# Patient Record
Sex: MALE | Race: ASIAN | NOT HISPANIC OR LATINO | URBAN - METROPOLITAN AREA
[De-identification: names, ages, dates, MRNs, and addresses within clinical notes are randomized per-mention and may not be internally consistent; named-entity substitution may affect disease eponyms.]

---

## 2023-10-24 ENCOUNTER — INPATIENT (INPATIENT)
Facility: HOSPITAL | Age: 28
LOS: 14 days | Discharge: HOME | End: 2023-11-08
Attending: STUDENT IN AN ORGANIZED HEALTH CARE EDUCATION/TRAINING PROGRAM | Admitting: STUDENT IN AN ORGANIZED HEALTH CARE EDUCATION/TRAINING PROGRAM
Payer: MEDICAID

## 2023-10-24 VITALS
DIASTOLIC BLOOD PRESSURE: 78 MMHG | SYSTOLIC BLOOD PRESSURE: 124 MMHG | HEART RATE: 110 BPM | TEMPERATURE: 99 F | OXYGEN SATURATION: 100 % | RESPIRATION RATE: 16 BRPM

## 2023-10-24 DIAGNOSIS — F29 UNSPECIFIED PSYCHOSIS NOT DUE TO A SUBSTANCE OR KNOWN PHYSIOLOGICAL CONDITION: ICD-10-CM

## 2023-10-24 LAB
ALBUMIN SERPL ELPH-MCNC: 4.8 G/DL — SIGNIFICANT CHANGE UP (ref 3.3–5)
ALBUMIN SERPL ELPH-MCNC: 4.8 G/DL — SIGNIFICANT CHANGE UP (ref 3.3–5)
ALP SERPL-CCNC: 79 U/L — SIGNIFICANT CHANGE UP (ref 40–120)
ALP SERPL-CCNC: 79 U/L — SIGNIFICANT CHANGE UP (ref 40–120)
ALT FLD-CCNC: 16 U/L — SIGNIFICANT CHANGE UP (ref 4–41)
ALT FLD-CCNC: 16 U/L — SIGNIFICANT CHANGE UP (ref 4–41)
AMPHET UR-MCNC: NEGATIVE — SIGNIFICANT CHANGE UP
AMPHET UR-MCNC: NEGATIVE — SIGNIFICANT CHANGE UP
ANION GAP SERPL CALC-SCNC: 12 MMOL/L — SIGNIFICANT CHANGE UP (ref 7–14)
ANION GAP SERPL CALC-SCNC: 12 MMOL/L — SIGNIFICANT CHANGE UP (ref 7–14)
APAP SERPL-MCNC: <10 UG/ML — LOW (ref 15–25)
APAP SERPL-MCNC: <10 UG/ML — LOW (ref 15–25)
APPEARANCE UR: CLEAR — SIGNIFICANT CHANGE UP
APPEARANCE UR: CLEAR — SIGNIFICANT CHANGE UP
AST SERPL-CCNC: 18 U/L — SIGNIFICANT CHANGE UP (ref 4–40)
AST SERPL-CCNC: 18 U/L — SIGNIFICANT CHANGE UP (ref 4–40)
BARBITURATES UR SCN-MCNC: NEGATIVE — SIGNIFICANT CHANGE UP
BARBITURATES UR SCN-MCNC: NEGATIVE — SIGNIFICANT CHANGE UP
BASOPHILS # BLD AUTO: 0.04 K/UL — SIGNIFICANT CHANGE UP (ref 0–0.2)
BASOPHILS # BLD AUTO: 0.04 K/UL — SIGNIFICANT CHANGE UP (ref 0–0.2)
BASOPHILS NFR BLD AUTO: 0.4 % — SIGNIFICANT CHANGE UP (ref 0–2)
BASOPHILS NFR BLD AUTO: 0.4 % — SIGNIFICANT CHANGE UP (ref 0–2)
BENZODIAZ UR-MCNC: NEGATIVE — SIGNIFICANT CHANGE UP
BENZODIAZ UR-MCNC: NEGATIVE — SIGNIFICANT CHANGE UP
BILIRUB SERPL-MCNC: 0.7 MG/DL — SIGNIFICANT CHANGE UP (ref 0.2–1.2)
BILIRUB SERPL-MCNC: 0.7 MG/DL — SIGNIFICANT CHANGE UP (ref 0.2–1.2)
BILIRUB UR-MCNC: NEGATIVE — SIGNIFICANT CHANGE UP
BILIRUB UR-MCNC: NEGATIVE — SIGNIFICANT CHANGE UP
BUN SERPL-MCNC: 8 MG/DL — SIGNIFICANT CHANGE UP (ref 7–23)
BUN SERPL-MCNC: 8 MG/DL — SIGNIFICANT CHANGE UP (ref 7–23)
CALCIUM SERPL-MCNC: 9.6 MG/DL — SIGNIFICANT CHANGE UP (ref 8.4–10.5)
CALCIUM SERPL-MCNC: 9.6 MG/DL — SIGNIFICANT CHANGE UP (ref 8.4–10.5)
CHLORIDE SERPL-SCNC: 102 MMOL/L — SIGNIFICANT CHANGE UP (ref 98–107)
CHLORIDE SERPL-SCNC: 102 MMOL/L — SIGNIFICANT CHANGE UP (ref 98–107)
CO2 SERPL-SCNC: 24 MMOL/L — SIGNIFICANT CHANGE UP (ref 22–31)
CO2 SERPL-SCNC: 24 MMOL/L — SIGNIFICANT CHANGE UP (ref 22–31)
COCAINE METAB.OTHER UR-MCNC: NEGATIVE — SIGNIFICANT CHANGE UP
COCAINE METAB.OTHER UR-MCNC: NEGATIVE — SIGNIFICANT CHANGE UP
COLOR SPEC: YELLOW — SIGNIFICANT CHANGE UP
COLOR SPEC: YELLOW — SIGNIFICANT CHANGE UP
CREAT SERPL-MCNC: 1.11 MG/DL — SIGNIFICANT CHANGE UP (ref 0.5–1.3)
CREAT SERPL-MCNC: 1.11 MG/DL — SIGNIFICANT CHANGE UP (ref 0.5–1.3)
CREATININE URINE RESULT, DAU: 58 MG/DL — SIGNIFICANT CHANGE UP
CREATININE URINE RESULT, DAU: 58 MG/DL — SIGNIFICANT CHANGE UP
DIFF PNL FLD: NEGATIVE — SIGNIFICANT CHANGE UP
DIFF PNL FLD: NEGATIVE — SIGNIFICANT CHANGE UP
EGFR: 93 ML/MIN/1.73M2 — SIGNIFICANT CHANGE UP
EGFR: 93 ML/MIN/1.73M2 — SIGNIFICANT CHANGE UP
EOSINOPHIL # BLD AUTO: 0.23 K/UL — SIGNIFICANT CHANGE UP (ref 0–0.5)
EOSINOPHIL # BLD AUTO: 0.23 K/UL — SIGNIFICANT CHANGE UP (ref 0–0.5)
EOSINOPHIL NFR BLD AUTO: 2.5 % — SIGNIFICANT CHANGE UP (ref 0–6)
EOSINOPHIL NFR BLD AUTO: 2.5 % — SIGNIFICANT CHANGE UP (ref 0–6)
ETHANOL SERPL-MCNC: <10 MG/DL — SIGNIFICANT CHANGE UP
ETHANOL SERPL-MCNC: <10 MG/DL — SIGNIFICANT CHANGE UP
GLUCOSE SERPL-MCNC: 92 MG/DL — SIGNIFICANT CHANGE UP (ref 70–99)
GLUCOSE SERPL-MCNC: 92 MG/DL — SIGNIFICANT CHANGE UP (ref 70–99)
GLUCOSE UR QL: NEGATIVE MG/DL — SIGNIFICANT CHANGE UP
GLUCOSE UR QL: NEGATIVE MG/DL — SIGNIFICANT CHANGE UP
HCT VFR BLD CALC: 49.1 % — SIGNIFICANT CHANGE UP (ref 39–50)
HCT VFR BLD CALC: 49.1 % — SIGNIFICANT CHANGE UP (ref 39–50)
HGB BLD-MCNC: 16.6 G/DL — SIGNIFICANT CHANGE UP (ref 13–17)
HGB BLD-MCNC: 16.6 G/DL — SIGNIFICANT CHANGE UP (ref 13–17)
IANC: 5.78 K/UL — SIGNIFICANT CHANGE UP (ref 1.8–7.4)
IANC: 5.78 K/UL — SIGNIFICANT CHANGE UP (ref 1.8–7.4)
IMM GRANULOCYTES NFR BLD AUTO: 0.2 % — SIGNIFICANT CHANGE UP (ref 0–0.9)
IMM GRANULOCYTES NFR BLD AUTO: 0.2 % — SIGNIFICANT CHANGE UP (ref 0–0.9)
KETONES UR-MCNC: NEGATIVE MG/DL — SIGNIFICANT CHANGE UP
KETONES UR-MCNC: NEGATIVE MG/DL — SIGNIFICANT CHANGE UP
LEUKOCYTE ESTERASE UR-ACNC: NEGATIVE — SIGNIFICANT CHANGE UP
LEUKOCYTE ESTERASE UR-ACNC: NEGATIVE — SIGNIFICANT CHANGE UP
LYMPHOCYTES # BLD AUTO: 2.59 K/UL — SIGNIFICANT CHANGE UP (ref 1–3.3)
LYMPHOCYTES # BLD AUTO: 2.59 K/UL — SIGNIFICANT CHANGE UP (ref 1–3.3)
LYMPHOCYTES # BLD AUTO: 28.1 % — SIGNIFICANT CHANGE UP (ref 13–44)
LYMPHOCYTES # BLD AUTO: 28.1 % — SIGNIFICANT CHANGE UP (ref 13–44)
MCHC RBC-ENTMCNC: 29 PG — SIGNIFICANT CHANGE UP (ref 27–34)
MCHC RBC-ENTMCNC: 29 PG — SIGNIFICANT CHANGE UP (ref 27–34)
MCHC RBC-ENTMCNC: 33.8 GM/DL — SIGNIFICANT CHANGE UP (ref 32–36)
MCHC RBC-ENTMCNC: 33.8 GM/DL — SIGNIFICANT CHANGE UP (ref 32–36)
MCV RBC AUTO: 85.7 FL — SIGNIFICANT CHANGE UP (ref 80–100)
MCV RBC AUTO: 85.7 FL — SIGNIFICANT CHANGE UP (ref 80–100)
METHADONE UR-MCNC: NEGATIVE — SIGNIFICANT CHANGE UP
METHADONE UR-MCNC: NEGATIVE — SIGNIFICANT CHANGE UP
MONOCYTES # BLD AUTO: 0.57 K/UL — SIGNIFICANT CHANGE UP (ref 0–0.9)
MONOCYTES # BLD AUTO: 0.57 K/UL — SIGNIFICANT CHANGE UP (ref 0–0.9)
MONOCYTES NFR BLD AUTO: 6.2 % — SIGNIFICANT CHANGE UP (ref 2–14)
MONOCYTES NFR BLD AUTO: 6.2 % — SIGNIFICANT CHANGE UP (ref 2–14)
NEUTROPHILS # BLD AUTO: 5.78 K/UL — SIGNIFICANT CHANGE UP (ref 1.8–7.4)
NEUTROPHILS # BLD AUTO: 5.78 K/UL — SIGNIFICANT CHANGE UP (ref 1.8–7.4)
NEUTROPHILS NFR BLD AUTO: 62.6 % — SIGNIFICANT CHANGE UP (ref 43–77)
NEUTROPHILS NFR BLD AUTO: 62.6 % — SIGNIFICANT CHANGE UP (ref 43–77)
NITRITE UR-MCNC: NEGATIVE — SIGNIFICANT CHANGE UP
NITRITE UR-MCNC: NEGATIVE — SIGNIFICANT CHANGE UP
NRBC # BLD: 0 /100 WBCS — SIGNIFICANT CHANGE UP (ref 0–0)
NRBC # BLD: 0 /100 WBCS — SIGNIFICANT CHANGE UP (ref 0–0)
NRBC # FLD: 0 K/UL — SIGNIFICANT CHANGE UP (ref 0–0)
NRBC # FLD: 0 K/UL — SIGNIFICANT CHANGE UP (ref 0–0)
OPIATES UR-MCNC: NEGATIVE — SIGNIFICANT CHANGE UP
OPIATES UR-MCNC: NEGATIVE — SIGNIFICANT CHANGE UP
OXYCODONE UR-MCNC: NEGATIVE — SIGNIFICANT CHANGE UP
OXYCODONE UR-MCNC: NEGATIVE — SIGNIFICANT CHANGE UP
PCP SPEC-MCNC: SIGNIFICANT CHANGE UP
PCP SPEC-MCNC: SIGNIFICANT CHANGE UP
PCP UR-MCNC: NEGATIVE — SIGNIFICANT CHANGE UP
PCP UR-MCNC: NEGATIVE — SIGNIFICANT CHANGE UP
PH UR: 7 — SIGNIFICANT CHANGE UP (ref 5–8)
PH UR: 7 — SIGNIFICANT CHANGE UP (ref 5–8)
PLATELET # BLD AUTO: 274 K/UL — SIGNIFICANT CHANGE UP (ref 150–400)
PLATELET # BLD AUTO: 274 K/UL — SIGNIFICANT CHANGE UP (ref 150–400)
POTASSIUM SERPL-MCNC: 3.9 MMOL/L — SIGNIFICANT CHANGE UP (ref 3.5–5.3)
POTASSIUM SERPL-MCNC: 3.9 MMOL/L — SIGNIFICANT CHANGE UP (ref 3.5–5.3)
POTASSIUM SERPL-SCNC: 3.9 MMOL/L — SIGNIFICANT CHANGE UP (ref 3.5–5.3)
POTASSIUM SERPL-SCNC: 3.9 MMOL/L — SIGNIFICANT CHANGE UP (ref 3.5–5.3)
PROT SERPL-MCNC: 7.3 G/DL — SIGNIFICANT CHANGE UP (ref 6–8.3)
PROT SERPL-MCNC: 7.3 G/DL — SIGNIFICANT CHANGE UP (ref 6–8.3)
PROT UR-MCNC: NEGATIVE MG/DL — SIGNIFICANT CHANGE UP
PROT UR-MCNC: NEGATIVE MG/DL — SIGNIFICANT CHANGE UP
RBC # BLD: 5.73 M/UL — SIGNIFICANT CHANGE UP (ref 4.2–5.8)
RBC # BLD: 5.73 M/UL — SIGNIFICANT CHANGE UP (ref 4.2–5.8)
RBC # FLD: 12.7 % — SIGNIFICANT CHANGE UP (ref 10.3–14.5)
RBC # FLD: 12.7 % — SIGNIFICANT CHANGE UP (ref 10.3–14.5)
SALICYLATES SERPL-MCNC: <0.3 MG/DL — LOW (ref 15–30)
SALICYLATES SERPL-MCNC: <0.3 MG/DL — LOW (ref 15–30)
SARS-COV-2 RNA SPEC QL NAA+PROBE: SIGNIFICANT CHANGE UP
SARS-COV-2 RNA SPEC QL NAA+PROBE: SIGNIFICANT CHANGE UP
SODIUM SERPL-SCNC: 138 MMOL/L — SIGNIFICANT CHANGE UP (ref 135–145)
SODIUM SERPL-SCNC: 138 MMOL/L — SIGNIFICANT CHANGE UP (ref 135–145)
SP GR SPEC: 1.01 — SIGNIFICANT CHANGE UP (ref 1–1.03)
SP GR SPEC: 1.01 — SIGNIFICANT CHANGE UP (ref 1–1.03)
THC UR QL: NEGATIVE — SIGNIFICANT CHANGE UP
THC UR QL: NEGATIVE — SIGNIFICANT CHANGE UP
TOXICOLOGY SCREEN, DRUGS OF ABUSE, SERUM RESULT: SIGNIFICANT CHANGE UP
TOXICOLOGY SCREEN, DRUGS OF ABUSE, SERUM RESULT: SIGNIFICANT CHANGE UP
TSH SERPL-MCNC: 1.25 UIU/ML — SIGNIFICANT CHANGE UP (ref 0.27–4.2)
TSH SERPL-MCNC: 1.25 UIU/ML — SIGNIFICANT CHANGE UP (ref 0.27–4.2)
UROBILINOGEN FLD QL: 0.2 MG/DL — SIGNIFICANT CHANGE UP (ref 0.2–1)
UROBILINOGEN FLD QL: 0.2 MG/DL — SIGNIFICANT CHANGE UP (ref 0.2–1)
WBC # BLD: 9.23 K/UL — SIGNIFICANT CHANGE UP (ref 3.8–10.5)
WBC # BLD: 9.23 K/UL — SIGNIFICANT CHANGE UP (ref 3.8–10.5)
WBC # FLD AUTO: 9.23 K/UL — SIGNIFICANT CHANGE UP (ref 3.8–10.5)
WBC # FLD AUTO: 9.23 K/UL — SIGNIFICANT CHANGE UP (ref 3.8–10.5)

## 2023-10-24 PROCEDURE — 99285 EMERGENCY DEPT VISIT HI MDM: CPT

## 2023-10-24 RX ORDER — CHLORPROMAZINE HCL 10 MG
50 TABLET ORAL EVERY 6 HOURS
Refills: 0 | Status: DISCONTINUED | OUTPATIENT
Start: 2023-10-25 | End: 2023-10-26

## 2023-10-24 RX ORDER — CHLORPROMAZINE HCL 10 MG
50 TABLET ORAL EVERY 6 HOURS
Refills: 0 | Status: DISCONTINUED | OUTPATIENT
Start: 2023-10-25 | End: 2023-11-08

## 2023-10-24 RX ORDER — ARIPIPRAZOLE 15 MG/1
5 TABLET ORAL DAILY
Refills: 0 | Status: DISCONTINUED | OUTPATIENT
Start: 2023-10-25 | End: 2023-10-27

## 2023-10-24 RX ORDER — DIPHENHYDRAMINE HCL 50 MG
50 CAPSULE ORAL AT BEDTIME
Refills: 0 | Status: DISCONTINUED | OUTPATIENT
Start: 2023-10-25 | End: 2023-11-08

## 2023-10-24 RX ORDER — ARIPIPRAZOLE 15 MG/1
5 TABLET ORAL DAILY
Refills: 0 | Status: DISCONTINUED | OUTPATIENT
Start: 2023-10-24 | End: 2023-10-25

## 2023-10-24 RX ORDER — DIPHENHYDRAMINE HCL 50 MG
50 CAPSULE ORAL AT BEDTIME
Refills: 0 | Status: DISCONTINUED | OUTPATIENT
Start: 2023-10-24 | End: 2023-10-25

## 2023-10-24 NOTE — ED PROVIDER NOTE - PHYSICAL EXAMINATION
CONSTITUTIONAL: Well-appearing; well-nourished; in no apparent distress;  HEAD: Normocephalic, atraumatic;  EYES: conjunctiva and sclera WNL;;  CARD: Normal S1, S2; no murmurs, rubs, or gallops noted  RESP: Normal chest excursion with respiration; breath sounds clear and equal bilaterally; no wheezes, rhonchi, or rales noted  EXT/MS: moves all extremities  SKIN: Normal for age and race  NEURO: Awake, alert, oriented x 3, no gross deficits  PSYCH: Normal mood; flat affect

## 2023-10-24 NOTE — ED BEHAVIORAL HEALTH ASSESSMENT NOTE - SUMMARY
27yo M with history of "Psychosis," (unclear to what degree Cannabis use played a part), last at baseline 2 years prior and never returned to that baseline, 1 prior inpatient psychiatric admission x 1 week at [facility] in 2021 for paranoia and put on Celexa and haldol;  was attending outpatient mental health services until 1 year ago, not on any psychiatric medications since that time presenting with increasingly disorganized, bizarre behavior, increased Holiness preoccupation, paranoia, poor sleep,    + poor ADLs (wearing/sleeping in same clothes for days, last hair cut 1 yr prior), restlessness, poor appetite, whispering to himself. Family is advocating for psychiatric admission. Patient is not able to meet his basic needs in his current state and meets involuntary 9.39 criteria.

## 2023-10-24 NOTE — ED BEHAVIORAL HEALTH ASSESSMENT NOTE - HPI (INCLUDE ILLNESS QUALITY, SEVERITY, DURATION, TIMING, CONTEXT, MODIFYING FACTORS, ASSOCIATED SIGNS AND SYMPTOMS)
As per request of provider, writer met with pt’s Mom Emilia [phone] and brother Adams [phone] to obtain collateral information. The following information is per the family.    Patient is a 29 Yo male domiciled w/ mother and brother, hx of psychosis , not working or studying, bib ems activated by family.    Reason for ED visit: Family followed pt after he went out for cigarettes. Pt reported walking to [facility] from [place] to apply for jobs. Family feels he has been more paranoid and disorganized since Friday 10/20/2023.    Symptoms/Hx:. Since Friday pt has been praying 5-10x a day and for as long as 40 minutes. Family says the pt reported being fearful on Friday and on Sunday he asked the mother to sleep with him at night. Family says pt has been sleeping in his clothes since Friday, sleeping poorly, and barely eating since Friday. Family says pt has not endorsed any si or hi and has no hx of this. Family says pt’s hygiene is okay but hasn’t gotten a haircut in 1 year. Family says pt spends his time for the past 1-2 years smoking cigarettes and watching tv. They noticed recently he has been more restless and not sitting on the couch. They suspect he is internally pre occupied and will whisper to himself. Family reports pt was in treatment 2 years ago but no treatment for the past 1.5- 2 years.     Baseline: at baseline he is friendly, works in [employer] and can have a full conversation. For the past 1-2 years he has been isolating, walking to [facility], smoking cigarettes and watching tv.     Drugs/Alcohol: no current drug use. Hx of marijuana use.    Stressors: unknown. Pt refuses treatment.    Treatment team: none currently. Pt has one prior admission at [facility] for a  week 2 years ago due to paranoia. At that time pt was fearful people was trying to get him and was running out into the highway.    Medical problems: none.    Medication: none.    Family hx : unknown.    Violence/Aggression: pt can be agitated with brother and will push him when they try to stop the pt from walking away.    Dispo: family is advocating for psych admission.      Electronic Signatures:  Sonu Osei (MSW)  (Signed 24-Oct-2023 16:41)  	Authored: NOTE 29yo M, single, noncaregiver, childless, never , not in school, unemployed, domiciled with mother and brother, at baseline described as friendly, worked in [employer] and can have a full conversation, started to show behavioral/functional changes for the past 1-2 years (ie. isolating, not able to work, spends his days watching TV, walks to local [employer] to get his cigarettes and smokes a lot), has 1 prior inpatient psychiatric admission x 1 week at [facility] 2 years ago (due to paranoia; Pt was fearful people was trying to get him and was running out into the highway), hx of Cannabis use, with reported hx of Psychosis (exact previous psychiatric diagnosis not known at this time), was attending outpatient mental health services until 1 year ago, not on any psychiatric medications at this time, has no known hx of illicit substance use, who was BIB EMS today for increasing disorganized, bizarre behavior. 911 was called by his family.     Namely, Patient ventured out for his usual [employer] stint but kept going. Patient's family followed at this time due to him acting increasingly oddly in the preceding week. Patient told them that he was walking to [facility] (from [place]) to apply for jobs (these job & interviews do not exist). Family noted that he has been more paranoid and disorganized since Friday 10/20/2023.. Since Friday pt has been praying 5-10x a day and for as long as 40 minutes which is not his norm. + tearful, reported feeling scared and Pt asked his mother to sleep with him at night. + poor ADLs - Pt is sleeping in his same regular clothes since Friday, sleeping poorly, and barely eating and his last haircut was 1 year ago. + Patient has been more restless and not sitting on the couch as he usually does when watching TV. + Patient noted to be whispering to himself. Family is advocating for psychiatric admission.     COLLATERAL FROM Pt’s Mom Emilia [phone] and brother Adams [phone]: please see separate  note  CVM no record of Patient 29yo [place]  M, single, noncaregiver, childless, never , not in school, unemployed, domiciled with mother and brother, at baseline described as friendly, worked in [employer] and can have a full conversation, started to show behavioral/functional changes for the past 1-2 years (ie. isolating, not able to work, spends his days watching TV, walks to local [employer] to get his cigarettes and smokes a lot), has 1 prior inpatient psychiatric admission x 1 week at [facility] 2 years ago (due to paranoia; Pt was fearful people was trying to get him and was running out into the highway; given Celexa, haldol), hx of Cannabis use, with reported hx of Psychosis (exact previous psychiatric diagnosis not known at this time), was attending outpatient mental health services until 1 year ago, not on any psychiatric medications at this time, has no known hx of illicit substance use, who was BIB EMS today for increasing disorganized, bizarre behavior. 911 was called by his family. Namely, Patient ventured out for his usual [employer] stint but kept going. Patient's family followed at this time due to him acting increasingly oddly in the preceding week. Patient told them that he was walking to [facility] (from [place]) to apply for jobs (these job & interviews do not exist). Family noted that he has been more paranoid and disorganized since Friday 10/20/2023.. Since Friday pt has been praying 5-10x a day and for as long as 40 minutes which is not his norm. + tearful, reported feeling scared and Pt asked his mother to sleep with him at night. + poor ADLs - Pt is sleeping in his same regular clothes since Friday, sleeping poorly, and barely eating and his last haircut was 1 year ago. + Patient has been more restless and not sitting on the couch as he usually does when watching TV. + Patient noted to be whispering to himself. Family is advocating for psychiatric admission.     EXAM: calm, cooperative, sitting on the bed with a wide-eye semi-intense (but not hostile) stare with decrease in blinking. Polite, answers all questions in a superficial, lacking in detail manner. For example: "I went to [employer]." Q: "for money?" A: "yes." Q: "to buy what?" A: " things." Patient says that he has been doing fine, sleeping, eating ok, likes watching "Friends" on TV although he considers himself a loner and does not want friends of his own. Patient denies paranoia such as being followed, watched etc, Says his mother and brother "worried about me and followed me on the street and then were yelling." Patient says he was brought here to make sure "tests are ok." Says he was at [facility] because of a "nervous breakdown," was put on sertraline and haldol which "made me feel lightheaded." Denies that he has been more Taoism as of late and says "I believe in Latter-day." Denies any suicidal ideation, intent or plan; denies any thoughts of hurting anyone else. Denies any substance use including cannabis.     COLLATERAL FROM Pt’s Mom Emilia [phone] and brother Adams [phone]: please see separate  note  CVM no record of Patient

## 2023-10-24 NOTE — ED BEHAVIORAL HEALTH ASSESSMENT NOTE - PSYCHIATRIC ISSUES AND PLAN (INCLUDE STANDING AND PRN MEDICATION)
start Abilify 5mg PO qd start Abilify 5mg PO qd (was lightheaded on haldol, Celexa). No indication of a mood component at this time - only psychosis

## 2023-10-24 NOTE — ED BEHAVIORAL HEALTH ASSESSMENT NOTE - RISK ASSESSMENT
Chronic risk factors: single, male gender, hx of psychosis, hx of substance use, noncompliance with medications and treatment. Protective factors: young; healthy; no suicide attempts; no self-injurious behavior; no hx of aggression/violence; no legal issues; stable domicile; strong family support. Acute risk factors identified: decompensated state impairing functioning

## 2023-10-24 NOTE — ED BEHAVIORAL HEALTH NOTE - BEHAVIORAL HEALTH NOTE
As per request of provider, writer met with pt’s Mom betsy [phone] and brother Adams [phone] to obtain collateral information. The following information is per the family.    Patient is a 29 Yo male domiciled w/ mother and brother, hx of psychosis , not working or studying, bib ems activated by family.    Reason for ED visit: Family followed pt after he went out for cigarettes. Pt reported walking to [facility] from [place] to apply for jobs. Family feels he has been more paranoid and disorganized since Friday 10/20/2023.    Symptoms/Hx:. Since Friday pt has been praying 5-10x a day and for as long as 40 minutes. Family says the pt reported being fearful on Friday and on Sunday he asked the mother to sleep with him at night. Family says pt has been sleeping in his clothes since Friday, sleeping poorly, and barely eating since Friday. Family says pt has not endorsed any si or hi and has no hx of this. Family says pt’s hygiene is okay but hasn’t gotten a haircut in 1 year. Family says pt spends his time for the past 1-2 years smoking cigarettes and watching tv. They noticed recently he has been more restless and not sitting on the couch. They suspect he is internally pre occupied and will whisper to himself. Family reports pt was in treatment 2 years ago but no treatment for the past 1.5- 2 years.     Baseline: at baseline he is friendly, works in [employer] and can have a full conversation. For the past 1-2 years he has been isolating, walking to [facility], smoking cigarettes and watching tv.     Drugs/Alcohol: no current drug use. Hx of marijuana use.    Stressors: unknown. Pt refuses treatment.    Treatment team: none currently. Pt has one prior admission at [facility] for a  week 2 years ago due to paranoia. At that time pt was fearful people was trying to get him and was running out into the highway.    Medical problems: none.    Medication: none.    Family hx : unknown.    Violence/Aggression: pt can be agitated with brother and will push him when they try to stop the pt from walking away.    Dispo: family is advocating for psych admission. As per request of provider, writer met with pt’s Mom betsy [phone] and brother Adams [phone] to obtain collateral information. The following information is per the family.    Patient is a 27 Yo male domiciled w/ mother and brother, hx of psychosis , not working or studying, bib ems activated by family.    Reason for ED visit: Family followed pt after he went out for cigarettes. Pt reported walking to [facility] from [place] to apply for jobs. Family feels he has been more paranoid and disorganized since Friday 10/20/2023.    Symptoms/Hx:. Since Friday pt has been praying 5-10x a day and for as long as 40 minutes. Family says the pt reported being fearful on Friday and on Sunday he asked the mother to sleep with him at night. Family says pt has been sleeping in his clothes since Friday, sleeping poorly, and barely eating since Friday. Family says pt has not endorsed any si or hi and has no hx of this. Family says pt’s hygiene is okay but hasn’t gotten a haircut in 1 year. Family says pt spends his time for the past 1-2 years smoking cigarettes and watching tv. They noticed recently he has been more restless and not sitting on the couch. They suspect he is internally pre occupied and will whisper to himself. Family reports pt was in treatment 2 years ago but no treatment for the past 1.5- 2 years.     Baseline: at baseline he is friendly, works in [employer] and can have a full conversation. For the past 1-2 years he has been isolating, walking to [facility], smoking cigarettes and watching tv.     Drugs/Alcohol: no current drug use. Hx of marijuana use.    Stressors: unknown. Pt refuses treatment.    Treatment team: none currently. Pt has one prior admission at [facility] for a  week 2 years ago due to paranoia. At that time pt was fearful people was trying to get him and was running out into the highway.    Medical problems: none.    Medication: none.    Family hx : unknown.    Violence/Aggression: pt can be agitated with brother and will push him when they try to stop the pt from walking away.    Dispo: family is advocating for psych admission.     Writer informed brother [phone] of pt's admission and that he is boarding due to bed availability. to be reviewed in OR/n/a

## 2023-10-24 NOTE — ED BEHAVIORAL HEALTH ASSESSMENT NOTE - OTHER
solemn, serious, nonlabile wide-eye semi-intense (but not hostile) stare with decrease in blinking. impaired overly long hair unable to fully ascertain given Pt's guarded and superficial answers low end of fair limited not able to exclude internal stimuli

## 2023-10-24 NOTE — ED ADULT TRIAGE NOTE - CHIEF COMPLAINT QUOTE
Pt c/o psychiatric evaluation. Reports family called EMS for concern for pt wandering frequently. Denies SI, HI, AH/VH, drug or ETOH use. Calm and cooperative. Hx of psychiatric admissions in past. Not on medications. To be seen in BH

## 2023-10-24 NOTE — ED ADULT NURSE NOTE - NSFALLUNIVINTERV_ED_ALL_ED
Bed/Stretcher in lowest position, wheels locked, appropriate side rails in place/Call bell, personal items and telephone in reach/Instruct patient to call for assistance before getting out of bed/chair/stretcher/Non-slip footwear applied when patient is off stretcher/[place] to call system/Physically safe environment - no spills, clutter or unnecessary equipment/Purposeful proactive rounding/Room/bathroom lighting operational, light cord in reach

## 2023-10-24 NOTE — ED BEHAVIORAL HEALTH ASSESSMENT NOTE - DETAILS
denies suicidality haldol, Celexa - lightheaded [facility] called for hand off family informed of admission, ED boarding until bed obtained

## 2023-10-24 NOTE — ED PROVIDER NOTE - OBJECTIVE STATEMENT
28yoM no PMH, domiciled with mom and brother, unemployed, past psych hx including hospitalization at [facility] for ~1week for a "nervous breakdown" and was started on celexa which he had stopped taking > 1 year ago, brought in by EMS after family concerned about him. As per EMS, family states that he has been leaving the house everyday, no idea where he is / where he is going. Today he was going outside for a walk to get some cigarettes and said his mom was following him, screaming at him and harrassing him. Pt states that he has been unemployed since last oct (was a ) and stopped working because he worked too much, and has been job searching and getting out of the house daily. Pt denies any SI/HI/AH/VH. denies drug use. went to rehab to stop smoking cigarettes and marijuana, however started smoking cigarettes again months back.

## 2023-10-24 NOTE — ED ADULT NURSE NOTE - OBJECTIVE STATEMENT
Pt arrives via ems. Pt's family called EMS for concern for pt wandering frequently. Denies SI, HI, AH/VH, drug or ETOH use. Calm and cooperative. Hx of psychiatric admissions in past. Not on medications.

## 2023-10-24 NOTE — ED PROVIDER NOTE - CLINICAL SUMMARY MEDICAL DECISION MAKING FREE TEXT BOX
28yoM no PMH, domiciled with mom and brother, unemployed, past psych hx including hospitalization at [facility] for ~1week for a "nervous breakdown" and was started on celexa which he had stopped taking > 1 year ago, brought in by EMS after family concerned about him. As per EMS, family states that he has been leaving the house everyday, no idea where he is / where he is going. Today he was going outside for a walk to get some cigarettes and said his mom was following him, screaming at him and harrassing him. Pt states that he has been unemployed since last oct (was a ) and stopped working because he worked too much, and has been job searching and getting out of the house daily. Pt denies any SI/HI/AH/VH. denies drug use. went to rehab to stop smoking cigarettes and marijuana, however started smoking cigarettes again months back.    pt with appropriate behavior, does not appear internally occupied or psychotic, remains calm and cooperative, good hygeine  do not suspect need for psych eval  will obtain collateral from [facility] from mother Noreen [phone]

## 2023-10-25 DIAGNOSIS — F12.10 CANNABIS ABUSE, UNCOMPLICATED: ICD-10-CM

## 2023-10-25 DIAGNOSIS — Z91.148 PATIENT'S OTHER NONCOMPLIANCE WITH MEDICATION REGIMEN FOR OTHER REASON: ICD-10-CM

## 2023-10-25 LAB
A1C WITH ESTIMATED AVERAGE GLUCOSE RESULT: 5.2 % — SIGNIFICANT CHANGE UP (ref 4–5.6)
A1C WITH ESTIMATED AVERAGE GLUCOSE RESULT: 5.2 % — SIGNIFICANT CHANGE UP (ref 4–5.6)
CHOLEST SERPL-MCNC: 233 MG/DL — HIGH
CHOLEST SERPL-MCNC: 233 MG/DL — HIGH
ESTIMATED AVERAGE GLUCOSE: 103 — SIGNIFICANT CHANGE UP
ESTIMATED AVERAGE GLUCOSE: 103 — SIGNIFICANT CHANGE UP
HDLC SERPL-MCNC: 38 MG/DL — LOW
HDLC SERPL-MCNC: 38 MG/DL — LOW
LIPID PNL WITH DIRECT LDL SERPL: 164 MG/DL — HIGH
LIPID PNL WITH DIRECT LDL SERPL: 164 MG/DL — HIGH
NON HDL CHOLESTEROL: 195 MG/DL — HIGH
NON HDL CHOLESTEROL: 195 MG/DL — HIGH
TRIGL SERPL-MCNC: 154 MG/DL — HIGH
TRIGL SERPL-MCNC: 154 MG/DL — HIGH

## 2023-10-25 PROCEDURE — 99223 1ST HOSP IP/OBS HIGH 75: CPT

## 2023-10-25 RX ORDER — INFLUENZA VIRUS VACCINE 15; 15; 15; 15 UG/.5ML; UG/.5ML; UG/.5ML; UG/.5ML
0.5 SUSPENSION INTRAMUSCULAR ONCE
Refills: 0 | Status: COMPLETED | OUTPATIENT
Start: 2023-10-25 | End: 2023-10-25

## 2023-10-25 RX ADMIN — ARIPIPRAZOLE 5 MILLIGRAM(S): 15 TABLET ORAL at 08:48

## 2023-10-25 NOTE — BH INPATIENT PSYCHIATRY ASSESSMENT NOTE - HPI (INCLUDE ILLNESS QUALITY, SEVERITY, DURATION, TIMING, CONTEXT, MODIFYING FACTORS, ASSOCIATED SIGNS AND SYMPTOMS)
Patient was seen and evaluated, chart, medications and labs reviewed. Case discussed with nursing team.  On service for this 28 year old single male, no dependents.  Patient domiciles in private residence with family, currently unemployed.  Patient has PPH diagnosis unknown at this time and history of Cannabis and nicotine abuse.  Patient has 1 prior inpatient hospitalizations at [facility] 2 yrs ago for paranoia.  EMS/911 called by family due to increasing bizarre/erratic  behaviors.   Patient is now re-hospitalized with a primary problem of psychotic decompensation and disorganization.  Patient admitted to [facility] on a 9.39 legal status. I have reviewed the initial psychiatric assessment in the electronic medical record, including the history of present illness, past psychiatric history, family/social history (no pertinent changes), and exam, and have confirmed the salient findings dated  10/24/23.  As per chart review, transferring records indicated the [place]  M, single, noncaregiver, childless, never , not in school, unemployed, domiciled with mother and brother, at baseline described as friendly, worked in [employer] and can have a full conversation, started to show behavioral/functional changes for the past 1-2 years (ie. isolating, not able to work, spends his days watching TV, walks to local  to get his cigarettes and smokes a lot), has 1 prior inpatient psychiatric admission x 1 week at [facility] 2 years ago (due to paranoia; Pt was fearful people was trying to get him and was running out into the highway; given Celexa, haldol), hx of Cannabis use, with reported hx of Psychosis (exact previous psychiatric diagnosis not known at this time), was attending outpatient mental health services until 1 year ago, not on any psychiatric medications at this time, has no known hx of illicit substance use, who was BIB EMS today for increasing disorganized, bizarre behavior. 911 was called by his family. Namely, Patient ventured out for his usual  stint but kept going. Patient's family followed at this time due to him acting increasingly oddly in the preceding week. Patient told them that he was walking to [facility] (from [place]) to apply for jobs (these job & interviews do not exist). Family noted that he has been more paranoid and disorganized since Friday 10/20/2023.. Since Friday pt has been praying 5-10x a day and for as long as 40 minutes which is not his norm. + tearful, reported feeling scared and Pt asked his mother to sleep with him at night. + poor ADLs - Pt is sleeping in his same regular clothes since Friday, sleeping poorly, and barely eating and his last haircut was 1 year ago. + Patient has been more restless and not sitting on the couch as he usually does when watching TV. + Patient noted to be whispering to himself. Family is advocating for psychiatric admission.   EXAM: calm, cooperative, sitting on the bed with a wide-eye semi-intense (but not hostile) stare with decrease in blinking. Polite, answers all questions in a superficial, lacking in detail manner. For example: "I went to [employer]." Q: "for money?" A: "yes." Q: "to buy what?" A: " things." Patient says that he has been doing fine, sleeping, eating ok, likes watching "Friends" on TV although he considers himself a loner and does not want friends of his own. Patient denies paranoia such as being followed, watched etc, Says his mother and brother "worried about me and followed me on the street and then were yelling." Patient says he was brought here to make sure "tests are ok." Says he was at [facility] because of a "nervous breakdown," was put on sertraline and haldol which "made me feel lightheaded." Denies that he has been more Sabianist as of late and says "I believe in Zoroastrianism." Denies any suicidal ideation, intent or plan; denies any thoughts of hurting anyone else. Denies any substance use including cannabis.   COLLATERAL FROM Pt’s Mom Emilia [phone] and brother Adams [phone].    On unit: Patient was seen and evaluated, chart, medications and labs reviewed. Case discussed with nursing team.  On service for this 28 year old single male, no dependents.  Patient domiciles in private residence with family, currently unemployed.  Patient has PPH diagnosis unknown at this time and history of Cannabis and nicotine abuse.  Patient has 1 prior inpatient hospitalizations at [facility] 2 yrs ago for paranoia.  EMS/911 called by family due to increasing bizarre/erratic  behaviors.   Patient is now re-hospitalized with a primary problem of psychotic decompensation and disorganization.  Patient admitted to [facility] on a 9.39 legal status. I have reviewed the initial psychiatric assessment in the electronic medical record, including the history of present illness, past psychiatric history, family/social history (no pertinent changes), and exam, and have confirmed the salient findings dated  10/24/23.  As per chart review, transferring records indicated the [place]iny[place]  M, single, noncaregiver, childless, never , not in school, unemployed, domiciled with mother and brother, at baseline described as friendly, worked in [employer] and can have a full conversation, started to show behavioral/functional changes for the past 1-2 years (ie. isolating, not able to work, spends his days watching TV, walks to local  to get his cigarettes and smokes a lot), has 1 prior inpatient psychiatric admission x 1 week at [facility] 2 years ago (due to paranoia; Pt was fearful people was trying to get him and was running out into the highway; given Celexa, haldol), hx of Cannabis use, with reported hx of Psychosis (exact previous psychiatric diagnosis not known at this time), was attending outpatient mental health services until 1 year ago, not on any psychiatric medications at this time, has no known hx of illicit substance use, who was BIB EMS today for increasing disorganized, bizarre behavior. 911 was called by his family. Namely, Patient ventured out for his usual  stint but kept going. Patient's family followed at this time due to him acting increasingly oddly in the preceding week. Patient told them that he was walking to [facility] (from [place]) to apply for jobs (these job & interviews do not exist). Family noted that he has been more paranoid and disorganized since Friday 10/20/2023.. Since Friday pt has been praying 5-10x a day and for as long as 40 minutes which is not his norm. + tearful, reported feeling scared and Pt asked his mother to sleep with him at night. + poor ADLs - Pt is sleeping in his same regular clothes since Friday, sleeping poorly, and barely eating and his last haircut was 1 year ago. + Patient has been more restless and not sitting on the couch as he usually does when watching TV. + Patient noted to be whispering to himself. Family is advocating for psychiatric admission.   EXAM: calm, cooperative, sitting on the bed with a wide-eye semi-intense (but not hostile) stare with decrease in blinking. Polite, answers all questions in a superficial, lacking in detail manner. For example: "I went to [employer]." Q: "for money?" A: "yes." Q: "to buy what?" A: " things." Patient says that he has been doing fine, sleeping, eating ok, likes watching "Friends" on TV although he considers himself a loner and does not want friends of his own. Patient denies paranoia such as being followed, watched etc, Says his mother and brother "worried about me and followed me on the street and then were yelling." Patient says he was brought here to make sure "tests are ok." Says he was at [facility] because of a "nervous breakdown," was put on sertraline and haldol which "made me feel lightheaded." Denies that he has been more Islam as of late and says "I believe in Jew." Denies any suicidal ideation, intent or plan; denies any thoughts of hurting anyone else. Denies any substance use including cannabis.   COLLATERAL FROM Pt’s Mom Emilia [phone] and brother Adams [phone].    On unit: information came from chart review and patient interview  Patient is seen privately for session. Patient presents calm, cooperative but very suspicious and guarded, answers to interview questions are calculated.  Patient provides minimally not providing much information.  When asked about precipitants leading to admission pt replies "I went to [employer] and my mother and brother followed me..that's about it"  When asked why his family would follow him to the bank pt stated "they do that a lot I don't know why they follow me"  Patient denies any mood dysregulation, denies  low mood or anxiety. Patient denies AVH and denies all family collateral information.  Pt declines to give consent to family members.  Patient reported that his mood to be  "fine  nothing is wrong”  Patient denies symptoms of: anhedonia, poor appetite (lost ..lbs), at times feels hopeless/emptiness, crying episodes, fatigue, insomnia, poor concentration.   Patient denies any interference  with his thinking and functionality.  Denies any current or past suicidal thoughts, or negative thoughts to self-harm. No thoughts to harm others.  Patient reports he is just dealing with recently moving into [employer].  States he lost his job and is now residing with mother  Patient denies any hx or current AVH, delusions, psychotic disorganization, mind reading abilities, thought insertion, ideas of reference, special segura, or thought broadcasting or paranoia. Denies history of aggression or violence. No access to firearm. Patient denies any symptoms suggestive of active yane: (denied grandiosity/ racing thoughts/ increased goal directed activities or engaged in risk taking behavior/ no pressured speech/ no elevated mood/ denied any increased in energy level causing sleep disruption), no signs of catatonia (with no signs of mutism, negativism, stereotypy, echolalia, echopraxia, posturing or rigidity. He denies obsessive, intrusive and persistent thoughts or compulsive, ritualistic acts are reported. Patient denies any active legal issues, is not currently under any kind of court supervision. Reports past hx of DWI at age 22.  Denies substance use, no alcohol, no vaping, reports 1pack cigs every 2 days, (pt declined smoking cessation interventions),  reviewed admitting u-tox is negative.  Patient  denied past  trauma. No PMH.

## 2023-10-25 NOTE — BH INPATIENT PSYCHIATRY ASSESSMENT NOTE - CURRENT MEDICATION
MEDICATIONS  (STANDING):  ARIPiprazole 5 milliGRAM(s) Oral daily  influenza   Vaccine 0.5 milliLiter(s) IntraMuscular once    MEDICATIONS  (PRN):  chlorproMAZINE    Injectable 50 milliGRAM(s) IntraMuscular every 6 hours PRN agitation  chlorproMAZINE    Tablet 50 milliGRAM(s) Oral every 6 hours PRN agitation  diphenhydrAMINE 50 milliGRAM(s) Oral at bedtime PRN Insomnia  LORazepam     Tablet 2 milliGRAM(s) Oral every 6 hours PRN Agitation  LORazepam   Injectable 2 milliGRAM(s) IntraMuscular every 6 hours PRN Agitation

## 2023-10-25 NOTE — BH INPATIENT PSYCHIATRY ASSESSMENT NOTE - NSBHMETABOLIC_PSY_ALL_CORE_FT
BMI:   HbA1c:   Glucose:   BP: 122/83 (10-24-23 @ 22:11) (111/81 - 124/78)  Lipid Panel:  BMI:   HbA1c: A1C with Estimated Average Glucose Result: 5.2 % (10-25-23 @ 08:00)    Glucose:   BP: 122/83 (10-24-23 @ 22:11) (111/81 - 124/78)  Lipid Panel: Date/Time: 10-25-23 @ 08:00  Cholesterol, Serum: 233  Direct LDL: --  HDL Cholesterol, Serum: 38  Total Cholesterol/HDL Ration Measurement: --  Triglycerides, Serum: 154

## 2023-10-25 NOTE — BH INPATIENT PSYCHIATRY ASSESSMENT NOTE - NSBHMSEKNOWHOW_PSY_ALL_CORE
Current Events/Vocabulary Cartilage Graft Text: The defect edges were debeveled with a #15 scalpel blade.  Given the location of the defect, shape of the defect, the fact the defect involved a full thickness cartilage defect a cartilage graft was deemed most appropriate.  An appropriate donor site was identified, cleansed, and anesthetized. The cartilage graft was then harvested and transferred to the recipient site, oriented appropriately and then sutured into place.  The secondary defect was then repaired using a primary closure.

## 2023-10-25 NOTE — PSYCHIATRIC REHAB INITIAL EVALUATION - NSBHPRRECOMMEND_PSY_ALL_CORE
Pt is a 29 y/o male. Pt had 1 prior psychiatric admission at [facility] 2 years ago. Pt currently not in treatment. Pt was admitted to [place] [id number] due to disorganization in context of medication non-compliance. Pt has hx of cannabis use. Pt stated he went to [employer] to get some cash to buy cigarettes yesterday. Pt stated his mother and brother followed him after he left the bank. Pt denies psych history, minimized all symptoms, and hx of substance use.  Pt stated he is currently unemployed. Pt stated he used to be a  for a year, but was terminated last year October due to budget issue.     Pt has poor historian. The following information would be obtained from medical record. Pt usually spend his days watching TV, walks to local [employer] to get cigarettes. Pt’s family concerned about his has been acting increasingly oddly in the preceding week. Pt told his family that he was talking from [place] to [facility] to apply for job, but job and interview did not exit. Pt also noticed pt has been more paranoid and disorganized since Friday 10/20/23 that he prayed 5 – 10 times a day as long as 40 minutes which is not his norm. Pt was become more tearful and scared. Pt asked his mother to sleep with him at night prior to his admission. Pt has poor ADLs that he has been earing the same clothes since Friday, sleep poor, barely eat, and his last hair cut was 1 year ago. Pt’s family also noticed that pt was whispering to himself and he become more restless.     Writer met with pt, introduced self and role on the unit. Writer has oriented psychiatric rehabilitation department function and services. Pt was provided with a copy of unit schedule and welcome letter.     During this assessment, pt is somewhat guarded, minimized all symptoms, however pt is pleasant.

## 2023-10-25 NOTE — BH INPATIENT PSYCHIATRY ASSESSMENT NOTE - NSBHCHARTREVIEWVS_PSY_A_CORE FT
Vital Signs Last 24 Hrs  T(C): 37 (10-25-23 @ 01:09), Max: 37.2 (10-24-23 @ 16:41)  T(F): 98.6 (10-25-23 @ 01:09), Max: 98.9 (10-24-23 @ 16:41)  HR: 89 (10-24-23 @ 22:11) (89 - 110)  BP: 122/83 (10-24-23 @ 22:11) (111/81 - 124/78)  BP(mean): --  RR: 17 (10-24-23 @ 22:11) (16 - 17)  SpO2: 100% (10-24-23 @ 22:11) (100% - 100%)    Orthostatic VS  10-25-23 @ 01:09  Lying BP: --/-- HR: --  Sitting BP: 116/87 HR: 85  Standing BP: 117/86 HR: 100  Site: --  Mode: --   Vital Signs Last 24 Hrs  T(C): 37 (10-25-23 @ 01:09), Max: 37.2 (10-24-23 @ 16:41)  T(F): 98.6 (10-25-23 @ 01:09), Max: 98.9 (10-24-23 @ 16:41)  HR: 89 (10-24-23 @ 22:11) (89 - 100)  BP: 122/83 (10-24-23 @ 22:11) (111/81 - 122/83)  BP(mean): --  RR: 16 (10-25-23 @ 01:09) (16 - 17)  SpO2: 100% (10-24-23 @ 22:11) (100% - 100%)    Orthostatic VS  10-25-23 @ 01:09  Lying BP: --/-- HR: --  Sitting BP: 116/87 HR: 85  Standing BP: 117/86 HR: 100  Site: --  Mode: --

## 2023-10-25 NOTE — BH INPATIENT PSYCHIATRY ASSESSMENT NOTE - OTHER
wide-eye semi-intense (but not hostile) stare with decrease in blinking. limited overly long hair solemn, serious, nonlabile not able to exclude internal stimuli low end of fair unable to fully ascertain given Pt's guarded and superficial answers impaired

## 2023-10-25 NOTE — BH INPATIENT PSYCHIATRY ASSESSMENT NOTE - ATTENDING COMMENTS
Chart was reviewed and case discussed with the Psych NP. I agree with the history, examination, assessment and plan as documented in the Psych NP's progress note and was directly involved in medical decision making.   Briefly this a 28 year old single male, domiciled in private residence with family, currently unemployed, pphx of psychosis, hx of cannabis/nicotine use d/o, 1 prior inpatient hospitalizations at [facility] 2 yrs ago for paranoia EMS called by family due to increasing bizarre/erratic  behaviors.  On exam, pt is calm, oddly related, in good control, concrete and poor historian, states parents were following him on the street, denies AVH/SI/HI.  Plan: start on Abilify 5 mg for psychosis

## 2023-10-25 NOTE — BH INPATIENT PSYCHIATRY ASSESSMENT NOTE - NSBHASSESSSUMMFT_PSY_ALL_CORE
Patient is a 28 year old single male, no dependents.  Patient domiciles in private residence with family, currently unemployed.  Patient has PPH diagnosis unknown at this time and history of Cannabis and nicotine abuse.  Patient has 1 prior inpatient hospitalizations at [facility] 2 yrs ago for paranoia.  EMS/911 called by family due to increasing bizarre/erratic  behaviors.   Patient is now re-hospitalized with a primary problem of psychotic decompensation and disorganization.  Patient admitted to [facility] on a 9.39 legal status. Patient requires inpatient hospitalization due to symptoms of mental illness so severe that they significantly interfere with activities of daily living, and presents a potential danger to self as a result of psychotic decompensation. He is requiring 24-hour care at this time as a result, for psychiatric stabilization and safety.    Plan:  >Legal: 9.39  >Obs: Routine, no current SI. no need for CO, patient not expected to pose risk to self or others in controlled inpatient setting  >Psychiatric Meds: Observe for tolerability and efficacy. Patient had been poorly adherent prior to admission.  -Abilify 5mg daily  -Gabapentin 300mg TID for cannabis use     PRN medications:  Ativan 2mg oral Q6HR PRN for agitation and anxiety.  Haldol 5mg oral Q6HR PRN for agitation.   Benadryl 50mg oral Q6HR PRN for agitation.   >Labs: Admission labs reviewed, no acute findings. Labs pending: A1c and Lipid panel. QT/QTc:   Hold antipsychotics if QTc >500  >Medical:   No acute concerns. No consultations needed at this time. No indication for CIWA. Patient with consistently stable VS, no visible physical symptoms of withdrawal.   During the course of treatment, will collaborate with medical team to manage medical issues.  >Diet: Regular  >Social: milieu/structured therapy  >Treatment Interventions: Groups and Individual Therapy/CBT, Motivational counseling for substance abuse related issues.   >Dispo: Collateral and dispo planning pending further symptom and medication optimization       Patient is a 28 year old single male, no dependents.  Patient domiciles in private residence with family, currently unemployed.  Patient has PPH diagnosis unknown at this time and history of Cannabis and nicotine abuse.  Patient has 1 prior inpatient hospitalizations at [facility] 2 yrs ago for paranoia.  EMS/911 called by family due to increasing bizarre/erratic  behaviors.   Patient is now re-hospitalized with a primary problem of psychotic decompensation and disorganization.  Patient admitted to [facility] on a 9.39 legal status. Patient requires inpatient hospitalization due to symptoms of mental illness so severe that they significantly interfere with activities of daily living, and presents a potential danger to self as a result of psychotic decompensation. He is requiring 24-hour care at this time as a result, for psychiatric stabilization and safety.    Plan:  >Legal: 9.39  >Obs: Routine, no current SI. no need for CO, patient not expected to pose risk to self or others in controlled inpatient setting  >Psychiatric Meds: Observe for tolerability and efficacy. Patient had been poorly adherent prior to admission.  -Abilify 5mg daily    PRN medications:  Ativan 2mg oral Q6HR PRN for agitation and anxiety.  Haldol 5mg oral Q6HR PRN for agitation.   Benadryl 50mg oral Q6HR PRN for agitation.   >Labs: Admission labs reviewed, no acute findings. Labs pending: A1c and Lipid panel. QT/QTc:   Hold antipsychotics if QTc >500  >Medical:   No acute concerns. No consultations needed at this time. No indication for CIWA. Patient with consistently stable VS, no visible physical symptoms of withdrawal.   During the course of treatment, will collaborate with medical team to manage medical issues.  >Diet: Regular  >Social: milieu/structured therapy  >Treatment Interventions: Groups and Individual Therapy/CBT, Motivational counseling for substance abuse related issues.   >Dispo: Collateral and dispo planning pending further symptom and medication optimization

## 2023-10-25 NOTE — BH SOCIAL WORK INITIAL PSYCHOSOCIAL EVALUATION - OTHER PAST PSYCHIATRIC HISTORY (INCLUDE DETAILS REGARDING ONSET, COURSE OF ILLNESS, INPATIENT/OUTPATIENT TREATMENT)
Pt is a 27YO single [place]  man, domiciled in a private residence in [place] with his mom and brother, BIB mom and brother for bizarre and erratic behaviors. PPH of one hospitalization at [facility] 2 years ago for similar presentation, bizarre behaviors, paranoia, dx with psychosis, denied SIHIIP/AVH, oddly related upon interview, pleasant but short with answers, spoke in a "[id number]" way. Per EMR, pt is grossly paranoid, asked to sleep in mom's bed because he was scared, often wanders and walks outside, irrational and disorganized thought processes. Per EMR/family, pt is with poor ADLs, lacks insight, has not changed hi clothes since Friday where family stated he became even more paranoid, has not been eating, has been labile (tearful at times), and praying 10x/day which is unusual for pt. Pt was previously in [facility], unknown where, but is not currently connected.   Pt denied substance use currently, however he endorsed a DWI when he was 22. Pt stated that he completed a substance abuse program and did not do any [facility] time. Pt stated he smoked 1/2 pack of cigarettes/day.   Pt denied medical issues.   Pt denied current legal issues.   Pt stated that he grew up with his mom and brother and currently live with them. Pt described a good upbringing, denied physical, emotional, sexual abuse, neglect, IPV/DV. Pt stated he does not have a significant other and he does not have any children. 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 Pt stated that he received an Associate's Degree. Pt discussed that he identifies with Yarsani, denied any restrictions at this time. Pt stated that he does not have a support network and does not have any friends and does not want friends. Pt stated that he enjoys watching tv and going for walks in his spare time.     Pt declined all consents at this time.     Pt is currently uninsured and Medicaid application is being processed.

## 2023-10-26 PROCEDURE — 99232 SBSQ HOSP IP/OBS MODERATE 35: CPT

## 2023-10-26 RX ORDER — CHLORPROMAZINE HCL 10 MG
50 TABLET ORAL ONCE
Refills: 0 | Status: DISCONTINUED | OUTPATIENT
Start: 2023-10-26 | End: 2023-11-08

## 2023-10-26 RX ORDER — DIPHENHYDRAMINE HCL 50 MG
50 CAPSULE ORAL ONCE
Refills: 0 | Status: DISCONTINUED | OUTPATIENT
Start: 2023-10-26 | End: 2023-11-08

## 2023-10-26 RX ADMIN — ARIPIPRAZOLE 5 MILLIGRAM(S): 15 TABLET ORAL at 08:45

## 2023-10-26 NOTE — BH INPATIENT PSYCHIATRY PROGRESS NOTE - OTHER
low end of fair solemn, serious, nonlabile overly long hair wide-eye semi-intense (but not hostile) stare with decrease in blinking. not able to exclude internal stimuli impaired limited unable to fully ascertain given Pt's guarded and superficial answers

## 2023-10-26 NOTE — BH INPATIENT PSYCHIATRY PROGRESS NOTE - NSBHASSESSSUMMFT_PSY_ALL_CORE
Patient is a 28 year old single male, no dependents.  Patient domiciles in private residence with family, currently unemployed.  Patient has PPH diagnosis unknown at this time and history of Cannabis and nicotine abuse.  Patient has 1 prior inpatient hospitalizations at [facility] 2 yrs ago for paranoia.  EMS/911 called by family due to increasing bizarre/erratic  behaviors.   Patient is now re-hospitalized with a primary problem of psychotic decompensation and disorganization.  Patient admitted to [facility] on a 9.39 legal status. Patient requires inpatient hospitalization due to symptoms of mental illness so severe that they significantly interfere with activities of daily living, and presents a potential danger to self as a result of psychotic decompensation. He is requiring 24-hour care at this time as a result, for psychiatric stabilization and safety.    Plan:  >Legal: 9.39  >Obs: Routine, no current SI. no need for CO, patient not expected to pose risk to self or others in controlled inpatient setting  >Psychiatric Meds: Observe for tolerability and efficacy. Patient had been poorly adherent prior to admission.  -Abilify 5mg daily, titrate 10mg on 10/27    PRN medications:  Ativan 2mg oral Q6HR PRN for agitation and anxiety.  Haldol 5mg oral Q6HR PRN for agitation.   Benadryl 50mg oral Q6HR PRN for agitation.   >Labs: Admission labs reviewed, no acute findings. Hold antipsychotics if QTc >500  >Medical:   No acute concerns. No consultations needed at this time. No indication for CIWA. Patient with consistently stable VS, no visible physical symptoms of withdrawal.   During the course of treatment, will collaborate with medical team to manage medical issues.  >Diet: Regular  >Social: milieu/structured therapy  >Treatment Interventions: Groups and Individual Therapy/CBT, Motivational counseling for substance abuse related issues.   >Dispo: Collateral and dispo planning pending further symptom and medication optimization

## 2023-10-26 NOTE — BH INPATIENT PSYCHIATRY PROGRESS NOTE - CURRENT MEDICATION
MEDICATIONS  (STANDING):  ARIPiprazole 5 milliGRAM(s) Oral daily  influenza   Vaccine 0.5 milliLiter(s) IntraMuscular once    MEDICATIONS  (PRN):  chlorproMAZINE    Injectable 50 milliGRAM(s) IntraMuscular once PRN Acute agitation  chlorproMAZINE    Tablet 50 milliGRAM(s) Oral every 6 hours PRN agitation  diphenhydrAMINE 50 milliGRAM(s) Oral at bedtime PRN Insomnia  diphenhydrAMINE Injectable 50 milliGRAM(s) IntraMuscular once PRN Acute agitation  LORazepam     Tablet 2 milliGRAM(s) Oral every 6 hours PRN Agitation  LORazepam   Injectable 2 milliGRAM(s) IntraMuscular every 6 hours PRN Agitation

## 2023-10-26 NOTE — BH INPATIENT PSYCHIATRY PROGRESS NOTE - ATTENDING COMMENTS
Chart was reviewed and case discussed with the Psych NP. I agree with the history, examination, assessment and plan as documented in the Psych NP's progress note and was directly involved in medical decision making.   Briefly this a 28 year old single male, domiciled in private residence with family, currently unemployed, pphx of psychosis, hx of cannabis/nicotine use d/o, 1 prior inpatient hospitalizations at [facility] 2 yrs ago for paranoia EMS called by family due to increasing bizarre/erratic  behaviors.  On exam, pt is calm, oddly related, in good control, concrete and poor historian, states parents were following him on the street, denies AVH/SI/HI.  Plan: start on Abilify 5 mg for psychosis  Chart was reviewed and case discussed with the Psych NP. I agree with the history, examination, assessment and plan as documented in the Psych NP's progress note and was directly involved in medical decision making.

## 2023-10-26 NOTE — BH INPATIENT PSYCHIATRY PROGRESS NOTE - NSBHCHARTREVIEWVS_PSY_A_CORE FT
Vital Signs Last 24 Hrs  T(C): 36.7 (10-25-23 @ 19:11), Max: 36.7 (10-25-23 @ 19:11)  T(F): 98.1 (10-25-23 @ 19:11), Max: 98.1 (10-25-23 @ 19:11)  HR: --  BP: --  BP(mean): --  RR: 16 (10-25-23 @ 20:16) (16 - 16)  SpO2: --    Orthostatic VS  10-25-23 @ 19:11  Lying BP: --/-- HR: --  Sitting BP: 108/65 HR: 96  Standing BP: 114/79 HR: 111  Site: --  Mode: --  Orthostatic VS  10-25-23 @ 01:09  Lying BP: --/-- HR: --  Sitting BP: 116/87 HR: 85  Standing BP: 117/86 HR: 100  Site: --  Mode: --   Vital Signs Last 24 Hrs  T(C): 36.8 (10-26-23 @ 08:41), Max: 36.8 (10-26-23 @ 08:41)  T(F): 98.3 (10-26-23 @ 08:41), Max: 98.3 (10-26-23 @ 08:41)  HR: --  BP: --  BP(mean): --  RR: 16 (10-25-23 @ 20:16) (16 - 16)  SpO2: --    Orthostatic VS  10-26-23 @ 08:41  Lying BP: --/-- HR: --  Sitting BP: 116/70 HR: 93  Standing BP: --/-- HR: --  Site: --  Mode: --  Orthostatic VS  10-25-23 @ 19:11  Lying BP: --/-- HR: --  Sitting BP: 108/65 HR: 96  Standing BP: 114/79 HR: 111  Site: --  Mode: --  Orthostatic VS  10-25-23 @ 01:09  Lying BP: --/-- HR: --  Sitting BP: 116/87 HR: 85  Standing BP: 117/86 HR: 100  Site: --  Mode: --

## 2023-10-26 NOTE — BH INPATIENT PSYCHIATRY PROGRESS NOTE - NSBHFUPINTERVALHXFT_PSY_A_CORE
Pt is a 27 y/o [place]  male domiciled in a private residence with his mom and brother. He is single, childless, never , & unemployed. Pt states that he was an  for a year, but stopped working bc he felt that he worked too much, and has been searching for a job since. Pt reports no PMHx, no known allergies. PPHx includes 1 prior hospitalization at [facility] 2 yrs ago for approx. 1 week d/t paranoia (pt was fearful people were trying to get him and was running out into the highway). Pt was treated w/ celexa & haldol, but stopped taking over 1 year ago bc it made him feel “lightheaded.” Pt is not currently on any medications. Pt was brought in by EMS after family called 911 d/t concerns about his behavior. As per EMS, the family states that the pt has been leaving the house everyday, wandering aimlessly. On Tuesday 10/24, pt endorses “walking to [employer] to take out money to buy cigarettes” and said his mom and brother were following him, screaming at him and harassing him. Pt states “I don’t know what they’re so worried about.” The family reports that the pt was attempting to walk to [facility] from [place] to apply for jobs (no interview was set up). Family endorses that the pt has been more paranoid and disorganized since Friday 10/20. Pt has been praying 5-10x a day for as long as 40 minutes (not normal for pt). Family also says the pt reported being fearful on Friday, so on Sunday he asked the mother to sleep with him at night. Family also reports that the pt seems internally preoccupied and whispers to himself. Pt is now admitted to [facility] on 9.39 legal status d/t psychotic decompensation and disorganization. Family states that over the last year or two, the pt has shown behavioral/functional decline as exhibited by quitting his job, isolating himself, & spending most days watching TV. Pt also has not gotten a haircut in over a year and has remained in the same clothing since Friday 10/20, has barely eaten. Pt denies any current or prior SI/HI/AH/CAH/VH. Pt denies drug use and alcohol consumption, but has a hx of cannabis use and currently smokes multiple packs of cigarettes per day. Pt previously attended rehab to stop smoking cigs and marijuana as well as outpatient mental health services, but began smoking cigs again a few months ago.  Pt denies depressive sx including sleep disturbance, anhedonia, feelings of guilt/worthlessness, decreased energy, difficulty concentrating, PMR/PMA, and SI/SIB. Also denies manic sx including distractibility, impulsivity, grandiosity, FOI, increased energy, and talkativeness. Responses to all questions are very short and guarded; seemingly calculated. Denies paranoia or feelings of dreaming while awake. Denies hx of assault, trauma, or abuse. Denies hx of gambling. Denies family hx of psychiatric or medical conditions. When asked about his personal strengths, he states that he’s a hard worker, but has nothing additional to say/elaborate on.  This morning, the pt states he didn’t feel like eating breakfast but did drink coffee. Endorses sleeping well. Took his Abilify 5mg this morning after asking the nurse if it was optional. After being told he could probably leave [facility] sooner if he’s compliant with his meds, he took the Abilify. Pt attended group therapy yesterday and says it was “fine.” I asked if there’s anything he likes to do such as playing sports, drawing, writing, etc. but pt denied any interests/hobbies aside from going for walks. I asked if he has any friends in the community or at home and he said, “No, definitely not,” and told me that he’s not interested in having any friends either. Denies feeling lonely or sad. Continues to be very guarded with his responses. Will continue to monitor and provide therapeutic support.

## 2023-10-26 NOTE — BH INPATIENT PSYCHIATRY PROGRESS NOTE - NSBHMETABOLIC_PSY_ALL_CORE_FT
BMI:   HbA1c: A1C with Estimated Average Glucose Result: 5.2 % (10-25-23 @ 08:00)    Glucose:   BP: 122/83 (10-24-23 @ 22:11) (111/81 - 124/78)  Lipid Panel: Date/Time: 10-25-23 @ 08:00  Cholesterol, Serum: 233  Direct LDL: --  HDL Cholesterol, Serum: 38  Total Cholesterol/HDL Ration Measurement: --  Triglycerides, Serum: 154

## 2023-10-27 PROCEDURE — 99232 SBSQ HOSP IP/OBS MODERATE 35: CPT

## 2023-10-27 RX ORDER — ARIPIPRAZOLE 15 MG/1
10 TABLET ORAL DAILY
Refills: 0 | Status: DISCONTINUED | OUTPATIENT
Start: 2023-10-27 | End: 2023-10-30

## 2023-10-27 RX ADMIN — ARIPIPRAZOLE 10 MILLIGRAM(S): 15 TABLET ORAL at 08:44

## 2023-10-27 NOTE — BH INPATIENT PSYCHIATRY PROGRESS NOTE - OTHER
overly long hair solemn, serious, nonlabile low end of fair impaired not able to exclude internal stimuli wide-eye semi-intense (but not hostile) stare with decrease in blinking. limited unable to fully ascertain given Pt's guarded and superficial answers

## 2023-10-27 NOTE — BH INPATIENT PSYCHIATRY PROGRESS NOTE - NSBHCHARTREVIEWVS_PSY_A_CORE FT
Vital Signs Last 24 Hrs  T(C): 36.8 (10-26-23 @ 19:23), Max: 36.8 (10-26-23 @ 08:41)  T(F): 98.3 (10-26-23 @ 19:23), Max: 98.3 (10-26-23 @ 08:41)  HR: --  BP: --  BP(mean): --  RR: --  SpO2: --    Orthostatic VS  10-26-23 @ 19:23  Lying BP: --/-- HR: --  Sitting BP: 123/79 HR: 99  Standing BP: 127/81 HR: 108  Site: --  Mode: --  Orthostatic VS  10-26-23 @ 08:41  Lying BP: --/-- HR: --  Sitting BP: 116/70 HR: 93  Standing BP: --/-- HR: --  Site: --  Mode: --  Orthostatic VS  10-25-23 @ 19:11  Lying BP: --/-- HR: --  Sitting BP: 108/65 HR: 96  Standing BP: 114/79 HR: 111  Site: --  Mode: --   Vital Signs Last 24 Hrs  T(C): 35.7 (10-27-23 @ 08:39), Max: 36.8 (10-26-23 @ 19:23)  T(F): 96.2 (10-27-23 @ 08:39), Max: 98.3 (10-26-23 @ 19:23)  HR: --  BP: --  BP(mean): --  RR: --  SpO2: --    Orthostatic VS  10-27-23 @ 08:39  Lying BP: --/-- HR: --  Sitting BP: 130/86 HR: 100  Standing BP: 129/84 HR: 120  Site: --  Mode: --  Orthostatic VS  10-26-23 @ 19:23  Lying BP: --/-- HR: --  Sitting BP: 123/79 HR: 99  Standing BP: 127/81 HR: 108  Site: --  Mode: --  Orthostatic VS  10-26-23 @ 08:41  Lying BP: --/-- HR: --  Sitting BP: 116/70 HR: 93  Standing BP: --/-- HR: --  Site: --  Mode: --  Orthostatic VS  10-25-23 @ 19:11  Lying BP: --/-- HR: --  Sitting BP: 108/65 HR: 96  Standing BP: 114/79 HR: 111  Site: --  Mode: --

## 2023-10-27 NOTE — BH INPATIENT PSYCHIATRY PROGRESS NOTE - NSBHMETABOLIC_PSY_ALL_CORE_FT
BMI:   HbA1c: A1C with Estimated Average Glucose Result: 5.2 % (10-25-23 @ 08:00)    Glucose:   BP: 122/83 (10-24-23 @ 22:11) (111/81 - 124/78)  Lipid Panel: Date/Time: 10-25-23 @ 08:00  Cholesterol, Serum: 233  Direct LDL: --  HDL Cholesterol, Serum: 38  Total Cholesterol/HDL Ration Measurement: --  Triglycerides, Serum: 154   BMI:   HbA1c: A1C with Estimated Average Glucose Result: 5.2 % (10-25-23 @ 08:00)    Glucose:   BP: 122/83 (10-24-23 @ 22:11) (111/81 - 122/83)  Lipid Panel: Date/Time: 10-25-23 @ 08:00  Cholesterol, Serum: 233  Direct LDL: --  HDL Cholesterol, Serum: 38  Total Cholesterol/HDL Ration Measurement: --  Triglycerides, Serum: 154

## 2023-10-27 NOTE — BH INPATIENT PSYCHIATRY PROGRESS NOTE - NSBHASSESSSUMMFT_PSY_ALL_CORE
Patient is a 28 year old single male, no dependents.  Patient domiciles in private residence with family, currently unemployed.  Patient has PPH diagnosis unknown at this time and history of Cannabis and nicotine abuse.  Patient has 1 prior inpatient hospitalizations at [facility] 2 yrs ago for paranoia.  EMS/911 called by family due to increasing bizarre/erratic  behaviors.   Patient is now re-hospitalized with a primary problem of psychotic decompensation and disorganization.  Patient admitted to [facility] on a 9.39 legal status. Patient requires inpatient hospitalization due to symptoms of mental illness so severe that they significantly interfere with activities of daily living, and presents a potential danger to self as a result of psychotic decompensation. He is requiring 24-hour care at this time as a result, for psychiatric stabilization and safety.    Plan:  >Legal: 9.39  >Obs: Routine, no current SI. no need for CO, patient not expected to pose risk to self or others in controlled inpatient setting  >Psychiatric Meds: Observe for tolerability and efficacy. Patient had been poorly adherent prior to admission.  -Abilify 5mg daily, titrate 10mg on 10/27    PRN medications:  Ativan 2mg oral Q6HR PRN for agitation and anxiety.  Haldol 5mg oral Q6HR PRN for agitation.   Benadryl 50mg oral Q6HR PRN for agitation.   >Labs:  labs reviewed, no acute findings. Hold antipsychotics if QTc >500  >Medical:   No acute concerns. No consultations needed at this time. No indication for CIWA. Patient with consistently stable VS, no visible physical symptoms of withdrawal.   During the course of treatment, will collaborate with medical team to manage medical issues.  >Diet: Regular  >Social: milieu/structured therapy  >Treatment Interventions: Groups and Individual Therapy/CBT, Motivational counseling for substance abuse related issues.   >Dispo: Collateral and dispo planning pending further symptom and medication optimization

## 2023-10-27 NOTE — BH INPATIENT PSYCHIATRY PROGRESS NOTE - ATTENDING COMMENTS
Chart was reviewed and case discussed with the Psych NP. I agree with the history, examination, assessment and plan as documented in the Psych NP's progress note and was directly involved in medical decision making.

## 2023-10-27 NOTE — BH INPATIENT PSYCHIATRY PROGRESS NOTE - NSBHFUPINTERVALHXFT_PSY_A_CORE
Pt seen and examined. No acute overnight events reported. Pt remains medication compliant, well-tolerated, without any reported side effects. NP titrated Abilify to 10mg today 10/27. Pt endorses attending group yesterday, although he says he stood off to the side bc all of the seats were taken. Denies SI/HI, no plan or intent. Denies AH/CAH/VH. Endorses eating well, no missed meals. States he woke up multiple times last night d/t the room being cold. I told him that he can request extra blankets from nursing staff if this occurs again. He stated, “I didn’t want to harass anyone.” I told him it’s important for him to get good sleep, so extra blankets are not an issue. Today he reports his mood is “fine,” did not elaborate further. In good behavioral control. Responses are still very guarded and calculated. Denies any acute complaints or medical problems. Will continue to monitor and provide therapeutic support. Pt verbally rescinded consent to speak to mother and brother as of today 10/27 at 12:30pm.

## 2023-10-28 RX ADMIN — ARIPIPRAZOLE 10 MILLIGRAM(S): 15 TABLET ORAL at 08:05

## 2023-10-28 NOTE — BH INPATIENT PSYCHIATRY PROGRESS NOTE - NSBHCHARTREVIEWVS_PSY_A_CORE FT
Vital Signs Last 24 Hrs  T(C): 36.3 (10-29-23 @ 08:11), Max: 36.5 (10-28-23 @ 19:28)  T(F): 97.3 (10-29-23 @ 08:11), Max: 97.7 (10-28-23 @ 19:28)  HR: --  BP: --  BP(mean): --  RR: --  SpO2: --    Orthostatic VS  10-29-23 @ 08:11  Lying BP: --/-- HR: --  Sitting BP: 113/77 HR: 92  Standing BP: 107/71 HR: 104  Site: --  Mode: --  Orthostatic VS  10-28-23 @ 19:28  Lying BP: --/-- HR: --  Sitting BP: 119/71 HR: 88  Standing BP: 125/75 HR: 99  Site: --  Mode: --  Orthostatic VS  10-28-23 @ 08:07  Lying BP: --/-- HR: --  Sitting BP: 112/74 HR: 89  Standing BP: 112/72 HR: 99  Site: --  Mode: --  Orthostatic VS  10-27-23 @ 19:18  Lying BP: --/-- HR: --  Sitting BP: 130/71 HR: 85  Standing BP: 124/78 HR: 93  Site: --  Mode: --

## 2023-10-28 NOTE — BH INPATIENT PSYCHIATRY PROGRESS NOTE - CURRENT MEDICATION
MEDICATIONS  (STANDING):  ARIPiprazole 10 milliGRAM(s) Oral daily  influenza   Vaccine 0.5 milliLiter(s) IntraMuscular once    MEDICATIONS  (PRN):  chlorproMAZINE    Injectable 50 milliGRAM(s) IntraMuscular once PRN Acute agitation  chlorproMAZINE    Tablet 50 milliGRAM(s) Oral every 6 hours PRN agitation  diphenhydrAMINE 50 milliGRAM(s) Oral at bedtime PRN Insomnia  diphenhydrAMINE Injectable 50 milliGRAM(s) IntraMuscular once PRN Acute agitation  LORazepam     Tablet 2 milliGRAM(s) Oral every 6 hours PRN Agitation  LORazepam   Injectable 2 milliGRAM(s) IntraMuscular once PRN Acute agitation

## 2023-10-28 NOTE — BH INPATIENT PSYCHIATRY PROGRESS NOTE - NSBHMETABOLIC_PSY_ALL_CORE_FT
BMI:   HbA1c: A1C with Estimated Average Glucose Result: 5.2 % (10-25-23 @ 08:00)    Glucose:   BP: --  Lipid Panel: Date/Time: 10-25-23 @ 08:00  Cholesterol, Serum: 233  Direct LDL: --  HDL Cholesterol, Serum: 38  Total Cholesterol/HDL Ration Measurement: --  Triglycerides, Serum: 154

## 2023-10-28 NOTE — BH INPATIENT PSYCHIATRY PROGRESS NOTE - NSBHASSESSSUMMFT_PSY_ALL_CORE
Patient is a 28 year old single male, no dependents.  Patient domiciles in private residence with family, currently unemployed.  Patient has PPH diagnosis unknown at this time and history of Cannabis and nicotine abuse.  Patient has 1 prior inpatient hospitalizations at [facility] 2 yrs ago for paranoia.  EMS/911 called by family due to increasing bizarre/erratic  behaviors.   Patient is now re-hospitalized with a primary problem of psychotic decompensation and disorganization.  Patient admitted to [facility] on a 9.39 legal status. Patient requires inpatient hospitalization due to symptoms of mental illness so severe that they significantly interfere with activities of daily living, and presents a potential danger to self as a result of psychotic decompensation. He is requiring 24-hour care at this time as a result, for psychiatric stabilization and safety.    10/28: Pt. is mostly isolated, is visible on the unit at times. Pt. w/ prominent negative Sx of psychosis; overt AVH, delusions not assessed.     Plan:  >Legal: 9.39  >Obs: Routine, no current SI. no need for CO, patient not expected to pose risk to self or others in controlled inpatient setting  >Psychiatric Meds: Observe for tolerability and efficacy. Patient had been poorly adherent prior to admission.  -Abilify 5mg daily, titrate 10mg on 10/27    PRN medications:  Ativan 2mg oral Q6HR PRN for agitation and anxiety.  Haldol 5mg oral Q6HR PRN for agitation.   Benadryl 50mg oral Q6HR PRN for agitation.   >Labs:  labs reviewed, no acute findings. Hold antipsychotics if QTc >500  >Medical:   No acute concerns. No consultations needed at this time. No indication for CIWA. Patient with consistently stable VS, no visible physical symptoms of withdrawal.   During the course of treatment, will collaborate with medical team to manage medical issues.  >Diet: Regular  >Social: milieu/structured therapy  >Treatment Interventions: Groups and Individual Therapy/CBT, Motivational counseling for substance abuse related issues.   >Dispo: Collateral and dispo planning pending further symptom and medication optimization

## 2023-10-28 NOTE — BH INPATIENT PSYCHIATRY PROGRESS NOTE - NSBHFUPINTERVALHXFT_PSY_A_CORE
Chart reviewed. No events reported overnight. The pt. reports he is sleeping and eating adequately. He is medication adherent, tolerating them well. VSS.   Pt. seen in his room. He reports his mood is improving. He denies AVH, delusions s/i/p, h/i/p. He offered no additional concerns.

## 2023-10-28 NOTE — BH INPATIENT PSYCHIATRY PROGRESS NOTE - OTHER
wide-eye semi-intense (but not hostile) stare with decrease in blinking. limited solemn, serious, nonlabile unable to fully ascertain given Pt's guarded and superficial answers low end of fair overly long hair impaired not able to exclude internal stimuli

## 2023-10-29 RX ADMIN — ARIPIPRAZOLE 10 MILLIGRAM(S): 15 TABLET ORAL at 08:28

## 2023-10-29 NOTE — BH INPATIENT PSYCHIATRY PROGRESS NOTE - NSBHASSESSSUMMFT_PSY_ALL_CORE
Patient is a 28 year old single male, no dependents.  Patient domiciles in private residence with family, currently unemployed.  Patient has PPH diagnosis unknown at this time and history of Cannabis and nicotine abuse.  Patient has 1 prior inpatient hospitalizations at [facility] 2 yrs ago for paranoia.  EMS/911 called by family due to increasing bizarre/erratic  behaviors.   Patient is now re-hospitalized with a primary problem of psychotic decompensation and disorganization.  Patient admitted to [facility] on a 9.39 legal status. Patient requires inpatient hospitalization due to symptoms of mental illness so severe that they significantly interfere with activities of daily living, and presents a potential danger to self as a result of psychotic decompensation. He is requiring 24-hour care at this time as a result, for psychiatric stabilization and safety.    10/28: Pt. is mostly isolated, is visible on the unit at times. Pt. w/ prominent negative Sx of psychosis; overt AVH, delusions not assessed.   10/29: Pt. remains mostly isolated, denies s/i/p. AVH, delusions, s/i/p not assessed.    Plan:  >Legal: 9.39  >Obs: Routine, no current SI. no need for CO, patient not expected to pose risk to self or others in controlled inpatient setting  >Psychiatric Meds: Observe for tolerability and efficacy. Patient had been poorly adherent prior to admission.  -Abilify 5mg daily, titrate 10mg on 10/27    PRN medications:  Ativan 2mg oral Q6HR PRN for agitation and anxiety.  Haldol 5mg oral Q6HR PRN for agitation.   Benadryl 50mg oral Q6HR PRN for agitation.   >Labs:  labs reviewed, no acute findings. Hold antipsychotics if QTc >500  >Medical:   No acute concerns. No consultations needed at this time. No indication for CIWA. Patient with consistently stable VS, no visible physical symptoms of withdrawal.   During the course of treatment, will collaborate with medical team to manage medical issues.  >Diet: Regular  >Social: milieu/structured therapy  >Treatment Interventions: Groups and Individual Therapy/CBT, Motivational counseling for substance abuse related issues.   >Dispo: Collateral and dispo planning pending further symptom and medication optimization

## 2023-10-29 NOTE — BH INPATIENT PSYCHIATRY PROGRESS NOTE - OTHER
wide-eye semi-intense (but not hostile) stare with decrease in blinking. solemn, serious, nonlabile not able to exclude internal stimuli impaired overly long hair unable to fully ascertain given Pt's guarded and superficial answers limited low end of fair

## 2023-10-29 NOTE — BH INPATIENT PSYCHIATRY PROGRESS NOTE - NSBHCHARTREVIEWVS_PSY_A_CORE FT
Vital Signs Last 24 Hrs  T(C): 37.1 (10-29-23 @ 19:26), Max: 37.1 (10-29-23 @ 19:26)  T(F): 98.8 (10-29-23 @ 19:26), Max: 98.8 (10-29-23 @ 19:26)  HR: --  BP: --  BP(mean): --  RR: --  SpO2: --    Orthostatic VS  10-29-23 @ 19:26  Lying BP: --/-- HR: --  Sitting BP: 128/74 HR: 95  Standing BP: 123/77 HR: 119  Site: --  Mode: electronic  Orthostatic VS  10-29-23 @ 08:11  Lying BP: --/-- HR: --  Sitting BP: 113/77 HR: 92  Standing BP: 107/71 HR: 104  Site: --  Mode: --  Orthostatic VS  10-28-23 @ 19:28  Lying BP: --/-- HR: --  Sitting BP: 119/71 HR: 88  Standing BP: 125/75 HR: 99  Site: --  Mode: --  Orthostatic VS  10-28-23 @ 08:07  Lying BP: --/-- HR: --  Sitting BP: 112/74 HR: 89  Standing BP: 112/72 HR: 99  Site: --  Mode: --

## 2023-10-30 PROCEDURE — 99232 SBSQ HOSP IP/OBS MODERATE 35: CPT

## 2023-10-30 RX ORDER — ARIPIPRAZOLE 15 MG/1
882 TABLET ORAL
Qty: 1 | Refills: 0
Start: 2023-10-30 | End: 2023-11-07

## 2023-10-30 RX ORDER — ARIPIPRAZOLE 15 MG/1
675 TABLET ORAL
Qty: 1 | Refills: 0
Start: 2023-10-30 | End: 2023-10-30

## 2023-10-30 RX ORDER — ARIPIPRAZOLE 15 MG/1
882 TABLET ORAL
Qty: 1 | Refills: 0
Start: 2023-10-30 | End: 2023-10-30

## 2023-10-30 RX ORDER — ARIPIPRAZOLE 15 MG/1
15 TABLET ORAL DAILY
Refills: 0 | Status: DISCONTINUED | OUTPATIENT
Start: 2023-10-30 | End: 2023-11-01

## 2023-10-30 RX ADMIN — ARIPIPRAZOLE 15 MILLIGRAM(S): 15 TABLET ORAL at 08:33

## 2023-10-30 NOTE — BH INPATIENT PSYCHIATRY PROGRESS NOTE - NSBHCHARTREVIEWVS_PSY_A_CORE FT
Vital Signs Last 24 Hrs  T(C): 37.1 (10-29-23 @ 19:26), Max: 37.1 (10-29-23 @ 19:26)  T(F): 98.8 (10-29-23 @ 19:26), Max: 98.8 (10-29-23 @ 19:26)  HR: --  BP: --  BP(mean): --  RR: --  SpO2: --    Orthostatic VS  10-29-23 @ 19:26  Lying BP: --/-- HR: --  Sitting BP: 128/74 HR: 95  Standing BP: 123/77 HR: 119  Site: --  Mode: electronic  Orthostatic VS  10-29-23 @ 08:11  Lying BP: --/-- HR: --  Sitting BP: 113/77 HR: 92  Standing BP: 107/71 HR: 104  Site: --  Mode: --  Orthostatic VS  10-28-23 @ 19:28  Lying BP: --/-- HR: --  Sitting BP: 119/71 HR: 88  Standing BP: 125/75 HR: 99  Site: --  Mode: --  Orthostatic VS  10-28-23 @ 08:07  Lying BP: --/-- HR: --  Sitting BP: 112/74 HR: 89  Standing BP: 112/72 HR: 99  Site: --  Mode: --   Vital Signs Last 24 Hrs  T(C): 36.6 (10-30-23 @ 08:35), Max: 37.1 (10-29-23 @ 19:26)  T(F): 97.8 (10-30-23 @ 08:35), Max: 98.8 (10-29-23 @ 19:26)  HR: --  BP: --  BP(mean): --  RR: --  SpO2: --    Orthostatic VS  10-30-23 @ 08:35  Lying BP: --/-- HR: --  Sitting BP: 104/69 HR: 100  Standing BP: --/-- HR: --  Site: --  Mode: --  Orthostatic VS  10-29-23 @ 19:26  Lying BP: --/-- HR: --  Sitting BP: 128/74 HR: 95  Standing BP: 123/77 HR: 119  Site: --  Mode: electronic  Orthostatic VS  10-29-23 @ 08:11  Lying BP: --/-- HR: --  Sitting BP: 113/77 HR: 92  Standing BP: 107/71 HR: 104  Site: --  Mode: --  Orthostatic VS  10-28-23 @ 19:28  Lying BP: --/-- HR: --  Sitting BP: 119/71 HR: 88  Standing BP: 125/75 HR: 99  Site: --  Mode: --   Vital Signs Last 24 Hrs  T(C): 36.6 (10-30-23 @ 08:35), Max: 37.1 (10-29-23 @ 19:26)  T(F): 97.8 (10-30-23 @ 08:35), Max: 98.8 (10-29-23 @ 19:26)  HR: --  BP: --  BP(mean): --  RR: 14 (10-30-23 @ 11:12) (14 - 14)  SpO2: --    Orthostatic VS  10-30-23 @ 08:35  Lying BP: --/-- HR: --  Sitting BP: 104/69 HR: 100  Standing BP: --/-- HR: --  Site: --  Mode: --  Orthostatic VS  10-29-23 @ 19:26  Lying BP: --/-- HR: --  Sitting BP: 128/74 HR: 95  Standing BP: 123/77 HR: 119  Site: --  Mode: electronic  Orthostatic VS  10-29-23 @ 08:11  Lying BP: --/-- HR: --  Sitting BP: 113/77 HR: 92  Standing BP: 107/71 HR: 104  Site: --  Mode: --  Orthostatic VS  10-28-23 @ 19:28  Lying BP: --/-- HR: --  Sitting BP: 119/71 HR: 88  Standing BP: 125/75 HR: 99  Site: --  Mode: --

## 2023-10-30 NOTE — BH INPATIENT PSYCHIATRY PROGRESS NOTE - NSBHFUPINTERVALHXFT_PSY_A_CORE
Pt seen and examined. No acute overnight events reported. Pt standing by the water fountain gazing at the nurses station; appears internally preoccupied. Pt remains medication compliant, well-tolerated, without any reported side effects. NP titrated Abilify to 15mg as of today 10/30. Pt endorses attending group over the weekend, although he says he tends to stand off to the side and just observe. Denies SI/HI, no plan or intent. Denies AH/CAH/VH. Endorses eating well, no missed meals. Endorses sleeping well, no longer complaining of being cold. In good behavioral control. Pt was visited yesterday by mother and brother; he says it went well. Pt also signed consent form to speak w/ mother and brother again. Reiterated this consent to me verbally this morning. Responses are still very guarded and calculated in general. Denies any acute complaints or medical problems. Will continue to monitor and provide therapeutic support.  Pt seen and examined. No acute overnight events reported. Pt standing by the water fountain gazing at the nurses station; appears internally preoccupied. Pt remains medication compliant, well-tolerated, without any reported side effects. NP titrated Abilify to 15mg as of today 10/30. Pt also agreed to [employer]UEV[facility], so NP will send pre-authorization to his insurance company today 10/30 in hopes of coverage approval. Pt endorses attending group over the weekend, although he says he tends to stand off to the side and just observe. Pt is endorsing L heel pain d/t hard floors, so NP advised pt to try to stay off his feet, keep his heel elevated, and she will also provide him w/ his shoes for extra support. Denies SI/HI, no plan or intent. Denies AH/CAH/VH. Endorses eating well, no missed meals. Endorses sleeping well, no longer complaining of being cold. In good behavioral control. Pt was visited yesterday by mother and brother; he says it went well. Pt also signed consent form to speak w/ mother and brother again. Reiterated this consent to me verbally this morning. Responses are still very guarded and calculated in general. Denies any other acute complaints or medical problems. Will continue to monitor and provide therapeutic support.  Pt seen and examined. No acute overnight events reported. Pt standing by the water fountain gazing at the nurses station; appears internally preoccupied. Pt remains medication compliant, well-tolerated, without any reported side effects. NP titrated Abilify to 15mg as of today 10/30. Pt also agreed to [employer] [facility]V[facility], so NP will send pre-authorization to his insurance company today 10/30 in hopes of coverage approval. Pt endorses attending group over the weekend, although he says he tends to stand off to the side and just observe. Pt is endorsing L heel pain d/t hard floors, so NP advised pt to try to stay off his feet, keep his heel elevated, and she will also provide him w/ his shoes for extra support. Denies SI/HI, no plan or intent. Denies AH/CAH/VH. Endorses eating well, no missed meals. Endorses sleeping well, no longer complaining of being cold. In good behavioral control. Pt was visited yesterday by mother and brother; he says it went well. Pt also signed consent form to speak w/ mother and brother again. Reiterated this consent to me verbally this morning. Responses are still very guarded and calculated in general. Denies any other acute complaints or medical problems. Will continue to monitor and provide therapeutic support.   Addendum:  NP spoke with patient's family (brother Adams  and mother Noreen) at [phone].  Discussed treatment progress, medication regimen/CUEVAS and aftercare recommendations.

## 2023-10-30 NOTE — BH INPATIENT PSYCHIATRY PROGRESS NOTE - NSBHASSESSSUMMFT_PSY_ALL_CORE
Patient is a 28 year old single male, no dependents.  Patient domiciles in private residence with family, currently unemployed.  Patient has PPH diagnosis unknown at this time and history of Cannabis and nicotine abuse.  Patient has 1 prior inpatient hospitalizations at [facility] 2 yrs ago for paranoia.  EMS/911 called by family due to increasing bizarre/erratic  behaviors.   Patient is now re-hospitalized with a primary problem of psychotic decompensation and disorganization.  Patient admitted to [facility] on a 9.39 legal status. Patient requires inpatient hospitalization due to symptoms of mental illness so severe that they significantly interfere with activities of daily living, and presents a potential danger to self as a result of psychotic decompensation. He is requiring 24-hour care at this time as a result, for psychiatric stabilization and safety.    Plan:  >Legal: 9.39  >Obs: Routine, no current SI. no need for CO, patient not expected to pose risk to self or others in controlled inpatient setting  >Psychiatric Meds: Observe for tolerability and efficacy. Patient had been poorly adherent prior to admission.  -Abilify 5mg daily, titrate 10mg on 10/27  PRN medications:  Ativan 2mg oral Q6HR PRN for agitation and anxiety.  Haldol 5mg oral Q6HR PRN for agitation.   Benadryl 50mg oral Q6HR PRN for agitation.   >Labs:  labs reviewed, no acute findings. Hold antipsychotics if QTc >500  >Medical:   No acute concerns. No consultations needed at this time. No indication for CIWA. Patient with consistently stable VS, no visible physical symptoms of withdrawal.   During the course of treatment, will collaborate with medical team to manage medical issues.  >Diet: Regular  >Social: milieu/structured therapy  >Treatment Interventions: Groups and Individual Therapy/CBT, Motivational counseling for substance abuse related issues.   >Dispo: Collateral and dispo planning pending further symptom and medication optimization       Patient is a 28 year old single male, no dependents.  Patient domiciles in private residence with family, currently unemployed.  Patient has PPH diagnosis unknown at this time and history of Cannabis and nicotine abuse.  Patient has 1 prior inpatient hospitalizations at [facility] 2 yrs ago for paranoia.  EMS/911 called by family due to increasing bizarre/erratic  behaviors.   Patient is now re-hospitalized with a primary problem of psychotic decompensation and disorganization.  Patient admitted to [facility] on a 9.39 legal status. Patient requires inpatient hospitalization due to symptoms of mental illness so severe that they significantly interfere with activities of daily living, and presents a potential danger to self as a result of psychotic decompensation. He is requiring 24-hour care at this time as a result, for psychiatric stabilization and safety.    Plan:  >Legal: 9.39  >Obs: Routine, no current SI. no need for CO, patient not expected to pose risk to self or others in controlled inpatient setting  >Psychiatric Meds: Observe for tolerability and efficacy. Patient had been poorly adherent prior to admission.  -Abilify 5mg daily, titrate 10mg on 10/27 --- titrate 15mg on 10/30  PRN medications:  Ativan 2mg oral Q6HR PRN for agitation and anxiety.  Haldol 5mg oral Q6HR PRN for agitation.   Benadryl 50mg oral Q6HR PRN for agitation.   >Labs:  labs reviewed, no acute findings. Hold antipsychotics if QTc >500  >Medical:   No acute concerns. No consultations needed at this time. No indication for CIWA. Patient with consistently stable VS, no visible physical symptoms of withdrawal.   During the course of treatment, will collaborate with medical team to manage medical issues.  >Diet: Regular  >Social: milieu/structured therapy  >Treatment Interventions: Groups and Individual Therapy/CBT, Motivational counseling for substance abuse related issues.   >Dispo: Collateral and dispo planning pending further symptom and medication optimization

## 2023-10-30 NOTE — BH INPATIENT PSYCHIATRY PROGRESS NOTE - CURRENT MEDICATION
MEDICATIONS  (STANDING):  ARIPiprazole 15 milliGRAM(s) Oral daily  influenza   Vaccine 0.5 milliLiter(s) IntraMuscular once    MEDICATIONS  (PRN):  chlorproMAZINE    Injectable 50 milliGRAM(s) IntraMuscular once PRN Acute agitation  chlorproMAZINE    Tablet 50 milliGRAM(s) Oral every 6 hours PRN agitation  diphenhydrAMINE 50 milliGRAM(s) Oral at bedtime PRN Insomnia  diphenhydrAMINE Injectable 50 milliGRAM(s) IntraMuscular once PRN Acute agitation  LORazepam     Tablet 2 milliGRAM(s) Oral every 6 hours PRN Agitation  LORazepam   Injectable 2 milliGRAM(s) IntraMuscular once PRN Acute agitation

## 2023-10-30 NOTE — BH INPATIENT PSYCHIATRY PROGRESS NOTE - OTHER
impaired low end of fair not able to exclude internal stimuli wide-eye semi-intense (but not hostile) stare with decrease in blinking. solemn, serious, guarded limited unable to fully ascertain given Pt's guarded and superficial answers

## 2023-10-31 PROCEDURE — 99232 SBSQ HOSP IP/OBS MODERATE 35: CPT

## 2023-10-31 PROCEDURE — 90853 GROUP PSYCHOTHERAPY: CPT

## 2023-10-31 RX ORDER — TRAZODONE HCL 50 MG
50 TABLET ORAL AT BEDTIME
Refills: 0 | Status: DISCONTINUED | OUTPATIENT
Start: 2023-10-31 | End: 2023-11-08

## 2023-10-31 RX ADMIN — ARIPIPRAZOLE 15 MILLIGRAM(S): 15 TABLET ORAL at 08:17

## 2023-10-31 RX ADMIN — Medication 50 MILLIGRAM(S): at 20:36

## 2023-10-31 NOTE — BH INPATIENT PSYCHIATRY PROGRESS NOTE - NSBHASSESSSUMMFT_PSY_ALL_CORE
Patient is a 28 year old single male, no dependents.  Patient domiciles in private residence with family, currently unemployed.  Patient has PPH diagnosis unknown at this time and history of Cannabis and nicotine abuse.  Patient has 1 prior inpatient hospitalizations at [facility] 2 yrs ago for paranoia.  EMS/911 called by family due to increasing bizarre/erratic  behaviors.   Patient is now re-hospitalized with a primary problem of psychotic decompensation and disorganization.  Patient admitted to [facility] on a 9.39 legal status. Patient requires inpatient hospitalization due to symptoms of mental illness so severe that they significantly interfere with activities of daily living, and presents a potential danger to self as a result of psychotic decompensation. He is requiring 24-hour care at this time as a result, for psychiatric stabilization and safety.    Plan:  >Legal: 9.39  >Obs: Routine, no current SI. no need for CO, patient not expected to pose risk to self or others in controlled inpatient setting  >Psychiatric Meds: Observe for tolerability and efficacy. Patient had been poorly adherent prior to admission.  -Abilify 5mg daily, titrate 10mg on 10/27 --- titrate 15mg on 10/30  PRN medications:  Ativan 2mg oral Q6HR PRN for agitation and anxiety.  Haldol 5mg oral Q6HR PRN for agitation.   Benadryl 50mg oral Q6HR PRN for agitation.   >Labs:  labs reviewed, no acute findings. Hold antipsychotics if QTc >500  >Medical:   No acute concerns. No consultations needed at this time. No indication for CIWA. Patient with consistently stable VS, no visible physical symptoms of withdrawal.   During the course of treatment, will collaborate with medical team to manage medical issues.  >Diet: Regular  >Social: milieu/structured therapy  >Treatment Interventions: Groups and Individual Therapy/CBT, Motivational counseling for substance abuse related issues.   >Dispo: Collateral and dispo planning pending further symptom and medication optimization

## 2023-10-31 NOTE — BH INPATIENT PSYCHIATRY PROGRESS NOTE - NSBHFUPINTERVALHXFT_PSY_A_CORE
Pt seen and examined. No acute overnight events reported. Pt standing by the water fountain gazing at the nurses station; appears internally preoccupied. Pt remains medication compliant, well-tolerated, without any reported side effects. NP titrated Abilify to 15mg as of yesterday 10/30. Pt also agreed to [employer]UEVAS but does not have insurance coverage - currently awaiting Medicaid to accept him as a new pt. NP will attempt to retroactively order the medication so pt can receive his 1st dose sometime this week. Pt endorses attending group yesterday and participated in discussion about recognizing stressors and how to cope. Pt is no longer endorsing L heel pain and states he no longer needs his shoes, as he doesn’t want staff to remove the laces. Denies SI/HI, no plan or intent. Denies AH/CAH/VH. Endorses eating well, no missed meals. Endorses sleeping well, but nurse said he spent a good portion of the night pacing up and down the hallway. Responses are still very guarded and calculated in general. In good behavioral control. NP spoke with patient's family (brother Adams and mother Noreen) at [phone] yesterday 10/30 to discuss treatment progress, medication regimen/CUEVAS and aftercare recommendations.  Denies any other acute complaints or medical problems. Will continue to monitor and provide therapeutic support.  Pt seen and examined. No acute overnight events reported. Pt standing by the water fountain gazing at the nurses station; appears internally preoccupied. Pt remains medication compliant, well-tolerated, without any reported side effects. NP titrated Abilify to 15mg as of yesterday 10/30. Pt also agreed to [employer]UEVAS but does not have insurance coverage - currently awaiting Medicaid to accept him as a new pt. SW will f/u with Medicaid if it will be retroactive and cover this. Pt endorses attending group yesterday and participated in discussion about recognizing stressors and how to cope. Pt is no longer endorsing L heel pain and states he no longer needs his shoes, as he doesn’t want staff to remove the laces. Denies SI/HI, no plan or intent. Denies AH/CAH/VH. Endorses eating well, no missed meals. Endorses sleeping well, but nurse said he spent a good portion of the night pacing up and down the hallway. Responses are still very guarded and calculated in general. In good behavioral control. NP spoke with patient's family (brother Adams and mother Noreen) at [phone] yesterday 10/30 to discuss treatment progress, medication regimen/CUEVAS and aftercare recommendations.  Denies any other acute complaints or medical problems. Will continue to monitor and provide therapeutic support.

## 2023-10-31 NOTE — BH INPATIENT PSYCHIATRY PROGRESS NOTE - CURRENT MEDICATION
MEDICATIONS  (STANDING):  ARIPiprazole 15 milliGRAM(s) Oral daily  influenza   Vaccine 0.5 milliLiter(s) IntraMuscular once    MEDICATIONS  (PRN):  chlorproMAZINE    Injectable 50 milliGRAM(s) IntraMuscular once PRN Acute agitation  chlorproMAZINE    Tablet 50 milliGRAM(s) Oral every 6 hours PRN agitation  diphenhydrAMINE 50 milliGRAM(s) Oral at bedtime PRN Insomnia  diphenhydrAMINE Injectable 50 milliGRAM(s) IntraMuscular once PRN Acute agitation  LORazepam     Tablet 2 milliGRAM(s) Oral every 6 hours PRN Agitation  LORazepam   Injectable 2 milliGRAM(s) IntraMuscular once PRN Acute agitation   MEDICATIONS  (STANDING):  ARIPiprazole 15 milliGRAM(s) Oral daily  influenza   Vaccine 0.5 milliLiter(s) IntraMuscular once  traZODone 50 milliGRAM(s) Oral at bedtime    MEDICATIONS  (PRN):  chlorproMAZINE    Injectable 50 milliGRAM(s) IntraMuscular once PRN Acute agitation  chlorproMAZINE    Tablet 50 milliGRAM(s) Oral every 6 hours PRN agitation  diphenhydrAMINE 50 milliGRAM(s) Oral at bedtime PRN Insomnia  diphenhydrAMINE Injectable 50 milliGRAM(s) IntraMuscular once PRN Acute agitation  LORazepam     Tablet 2 milliGRAM(s) Oral every 6 hours PRN Agitation  LORazepam   Injectable 2 milliGRAM(s) IntraMuscular once PRN Acute agitation

## 2023-10-31 NOTE — BH INPATIENT PSYCHIATRY PROGRESS NOTE - OTHER
solemn, serious, guarded limited wide-eye semi-intense (but not hostile) stare with decrease in blinking. not able to exclude internal stimuli low end of fair impaired unable to fully ascertain given Pt's guarded and superficial answers

## 2023-10-31 NOTE — BH INPATIENT PSYCHIATRY PROGRESS NOTE - NSBHCHARTREVIEWVS_PSY_A_CORE FT
No Vital Signs Last 24 Hrs  T(C): 36.6 (10-30-23 @ 19:17), Max: 36.6 (10-30-23 @ 08:35)  T(F): 97.9 (10-30-23 @ 19:17), Max: 97.9 (10-30-23 @ 19:17)  HR: --  BP: --  BP(mean): --  RR: 16 (10-30-23 @ 20:49) (14 - 16)  SpO2: --    Orthostatic VS  10-30-23 @ 19:17  Lying BP: --/-- HR: --  Sitting BP: 127/75 HR: 92  Standing BP: 122/80 HR: 97  Site: --  Mode: --  Orthostatic VS  10-30-23 @ 08:35  Lying BP: --/-- HR: --  Sitting BP: 104/69 HR: 100  Standing BP: --/-- HR: --  Site: --  Mode: --  Orthostatic VS  10-29-23 @ 19:26  Lying BP: --/-- HR: --  Sitting BP: 128/74 HR: 95  Standing BP: 123/77 HR: 119  Site: --  Mode: electronic  Orthostatic VS  10-29-23 @ 08:11  Lying BP: --/-- HR: --  Sitting BP: 113/77 HR: 92  Standing BP: 107/71 HR: 104  Site: --  Mode: --   Vital Signs Last 24 Hrs  T(C): 36.2 (10-31-23 @ 08:09), Max: 36.6 (10-30-23 @ 19:17)  T(F): 97.2 (10-31-23 @ 08:09), Max: 97.9 (10-30-23 @ 19:17)  HR: --  BP: --  BP(mean): --  RR: 16 (10-30-23 @ 20:49) (16 - 16)  SpO2: --    Orthostatic VS  10-31-23 @ 08:09  Lying BP: --/-- HR: --  Sitting BP: 123/70 HR: 97  Standing BP: 119/78 HR: 117  Site: --  Mode: --  Orthostatic VS  10-30-23 @ 19:17  Lying BP: --/-- HR: --  Sitting BP: 127/75 HR: 92  Standing BP: 122/80 HR: 97  Site: --  Mode: --  Orthostatic VS  10-30-23 @ 08:35  Lying BP: --/-- HR: --  Sitting BP: 104/69 HR: 100  Standing BP: --/-- HR: --  Site: --  Mode: --  Orthostatic VS  10-29-23 @ 19:26  Lying BP: --/-- HR: --  Sitting BP: 128/74 HR: 95  Standing BP: 123/77 HR: 119  Site: --  Mode: electronic

## 2023-10-31 NOTE — BH PSYCHOLOGY - GROUP THERAPY NOTE - NSBHPSYCHOLRESPCOMMENT_PSY_A_CORE FT
Pt appeared adequately groomed and casually dressed. Pt shared that he would like to be more mindful today by sleeping more.

## 2023-11-01 PROCEDURE — 90832 PSYTX W PT 30 MINUTES: CPT | Mod: 59

## 2023-11-01 PROCEDURE — 90853 GROUP PSYCHOTHERAPY: CPT

## 2023-11-01 PROCEDURE — 99232 SBSQ HOSP IP/OBS MODERATE 35: CPT

## 2023-11-01 RX ORDER — ARIPIPRAZOLE 15 MG/1
882 TABLET ORAL ONCE
Refills: 0 | Status: COMPLETED | OUTPATIENT
Start: 2023-11-03 | End: 2023-11-03

## 2023-11-01 RX ORDER — ARIPIPRAZOLE 15 MG/1
30 TABLET ORAL ONCE
Refills: 0 | Status: COMPLETED | OUTPATIENT
Start: 2023-11-02 | End: 2023-11-02

## 2023-11-01 RX ORDER — ARIPIPRAZOLE 15 MG/1
675 TABLET ORAL ONCE
Refills: 0 | Status: COMPLETED | OUTPATIENT
Start: 2023-11-02 | End: 2023-11-02

## 2023-11-01 RX ADMIN — Medication 50 MILLIGRAM(S): at 20:25

## 2023-11-01 RX ADMIN — ARIPIPRAZOLE 15 MILLIGRAM(S): 15 TABLET ORAL at 08:54

## 2023-11-01 NOTE — BH INPATIENT PSYCHIATRY PROGRESS NOTE - NSBHCHARTREVIEWVS_PSY_A_CORE FT
Vital Signs Last 24 Hrs  T(C): 36.7 (10-31-23 @ 18:31), Max: 36.7 (10-31-23 @ 18:31)  T(F): 98 (10-31-23 @ 18:31), Max: 98 (10-31-23 @ 18:31)  HR: --  BP: --  BP(mean): --  RR: 16 (10-31-23 @ 20:18) (16 - 16)  SpO2: --    Orthostatic VS  10-31-23 @ 18:31  Lying BP: --/-- HR: --  Sitting BP: 114/69 HR: 101  Standing BP: 101/71 HR: 102  Site: --  Mode: --  Orthostatic VS  10-31-23 @ 08:09  Lying BP: --/-- HR: --  Sitting BP: 123/70 HR: 97  Standing BP: 119/78 HR: 117  Site: --  Mode: --  Orthostatic VS  10-30-23 @ 19:17  Lying BP: --/-- HR: --  Sitting BP: 127/75 HR: 92  Standing BP: 122/80 HR: 97  Site: --  Mode: --  Orthostatic VS  10-30-23 @ 08:35  Lying BP: --/-- HR: --  Sitting BP: 104/69 HR: 100  Standing BP: --/-- HR: --  Site: --  Mode: --   Vital Signs Last 24 Hrs  T(C): 36.7 (11-01-23 @ 08:27), Max: 36.7 (10-31-23 @ 18:31)  T(F): 98 (11-01-23 @ 08:27), Max: 98 (10-31-23 @ 18:31)  HR: --  BP: --  BP(mean): --  RR: 16 (10-31-23 @ 20:18) (16 - 16)  SpO2: --    Orthostatic VS  11-01-23 @ 08:27  Lying BP: --/-- HR: --  Sitting BP: 104/72 HR: 93  Standing BP: --/-- HR: --  Site: --  Mode: --  Orthostatic VS  10-31-23 @ 18:31  Lying BP: --/-- HR: --  Sitting BP: 114/69 HR: 101  Standing BP: 101/71 HR: 102  Site: --  Mode: --  Orthostatic VS  10-31-23 @ 08:09  Lying BP: --/-- HR: --  Sitting BP: 123/70 HR: 97  Standing BP: 119/78 HR: 117  Site: --  Mode: --  Orthostatic VS  10-30-23 @ 19:17  Lying BP: --/-- HR: --  Sitting BP: 127/75 HR: 92  Standing BP: 122/80 HR: 97  Site: --  Mode: --

## 2023-11-01 NOTE — BH INPATIENT PSYCHIATRY PROGRESS NOTE - CURRENT MEDICATION
MEDICATIONS  (STANDING):  ARIPiprazole 15 milliGRAM(s) Oral daily  influenza   Vaccine 0.5 milliLiter(s) IntraMuscular once  traZODone 50 milliGRAM(s) Oral at bedtime    MEDICATIONS  (PRN):  chlorproMAZINE    Injectable 50 milliGRAM(s) IntraMuscular once PRN Acute agitation  chlorproMAZINE    Tablet 50 milliGRAM(s) Oral every 6 hours PRN agitation  diphenhydrAMINE 50 milliGRAM(s) Oral at bedtime PRN Insomnia  diphenhydrAMINE Injectable 50 milliGRAM(s) IntraMuscular once PRN Acute agitation  LORazepam     Tablet 2 milliGRAM(s) Oral every 6 hours PRN Agitation  LORazepam   Injectable 2 milliGRAM(s) IntraMuscular once PRN Acute agitation   MEDICATIONS  (STANDING):  influenza   Vaccine 0.5 milliLiter(s) IntraMuscular once  traZODone 50 milliGRAM(s) Oral at bedtime    MEDICATIONS  (PRN):  chlorproMAZINE    Injectable 50 milliGRAM(s) IntraMuscular once PRN Acute agitation  chlorproMAZINE    Tablet 50 milliGRAM(s) Oral every 6 hours PRN agitation  diphenhydrAMINE 50 milliGRAM(s) Oral at bedtime PRN Insomnia  diphenhydrAMINE Injectable 50 milliGRAM(s) IntraMuscular once PRN Acute agitation  LORazepam     Tablet 2 milliGRAM(s) Oral every 6 hours PRN Agitation  LORazepam   Injectable 2 milliGRAM(s) IntraMuscular once PRN Acute agitation

## 2023-11-01 NOTE — BH PSYCHOLOGY - GROUP THERAPY NOTE - NSBHPSYCHOLRESPCOMMENT_PSY_A_CORE FT
Pt appeared adequately groomed and casually dressed. Pt was mostly quiet during group discussion and expressed that he agrees with what other pts said.

## 2023-11-01 NOTE — BH INPATIENT PSYCHIATRY PROGRESS NOTE - NSBHASSESSSUMMFT_PSY_ALL_CORE
Patient is a 28 year old single male, no dependents.  Patient domiciles in private residence with family, currently unemployed.  Patient has PPH diagnosis unknown at this time and history of Cannabis and nicotine abuse.  Patient has 1 prior inpatient hospitalizations at [facility] 2 yrs ago for paranoia.  EMS/911 called by family due to increasing bizarre/erratic  behaviors.   Patient is now re-hospitalized with a primary problem of psychotic decompensation and disorganization.  Patient admitted to [facility] on a 9.39 legal status. Patient requires inpatient hospitalization due to symptoms of mental illness so severe that they significantly interfere with activities of daily living, and presents a potential danger to self as a result of psychotic decompensation. He is requiring 24-hour care at this time as a result, for psychiatric stabilization and safety.    Plan:  >Legal: 9.39  >Obs: Routine, no current SI. no need for CO, patient not expected to pose risk to self or others in controlled inpatient setting  >Psychiatric Meds: Observe for tolerability and efficacy. Patient had been poorly adherent prior to admission.  -Abilify 5mg daily, titrate 10mg on 10/27 --- titrate 15mg on 10/30  PRN medications:  Ativan 2mg oral Q6HR PRN for agitation and anxiety.  Haldol 5mg oral Q6HR PRN for agitation.   Benadryl 50mg oral Q6HR PRN for agitation.   >Labs:  labs reviewed, no acute findings. Hold antipsychotics if QTc >500  >Medical:   No acute concerns. No consultations needed at this time. No indication for CIWA. Patient with consistently stable VS, no visible physical symptoms of withdrawal.   During the course of treatment, will collaborate with medical team to manage medical issues.  >Diet: Regular  >Social: milieu/structured therapy  >Treatment Interventions: Groups and Individual Therapy/CBT, Motivational counseling for substance abuse related issues.   >Dispo: Collateral and dispo planning pending further symptom and medication optimization       Patient is a 28 year old single male, no dependents.  Patient domiciles in private residence with family, currently unemployed.  Patient has PPH diagnosis unknown at this time and history of Cannabis and nicotine abuse.  Patient has 1 prior inpatient hospitalizations at [facility] 2 yrs ago for paranoia.  EMS/911 called by family due to increasing bizarre/erratic  behaviors.   Patient is now re-hospitalized with a primary problem of psychotic decompensation and disorganization.  Patient admitted to [facility] on a 9.39 legal status. Patient requires inpatient hospitalization due to symptoms of mental illness so severe that they significantly interfere with activities of daily living, and presents a potential danger to self as a result of psychotic decompensation. He is requiring 24-hour care at this time as a result, for psychiatric stabilization and safety.    Plan:  >Legal: 9.39  >Obs: Routine, no current SI. no need for CO, patient not expected to pose risk to self or others in controlled inpatient setting  >Psychiatric Meds: Observe for tolerability and efficacy. Patient had been poorly adherent prior to admission.  -Abilify 5mg daily, titrate 10mg on 10/27 --- titrate 15mg on 10/30  -Insurance approved for CUEVAS...  -plan to give one time dose Abilify 30mg on 11/2 with Aristada Initio 675mg  -Aristada 882mg on 11/3  PRN medications:  Ativan 2mg oral Q6HR PRN for agitation and anxiety.  Haldol 5mg oral Q6HR PRN for agitation.   Benadryl 50mg oral Q6HR PRN for agitation.   >Labs:  labs reviewed, no acute findings. Hold antipsychotics if QTc >500  >Medical:   No acute concerns. No consultations needed at this time. No indication for CIWA. Patient with consistently stable VS, no visible physical symptoms of withdrawal.   During the course of treatment, will collaborate with medical team to manage medical issues.  >Diet: Regular  >Social: milieu/structured therapy  >Treatment Interventions: Groups and Individual Therapy/CBT, Motivational counseling for substance abuse related issues.   >Dispo: Collateral and dispo planning pending further symptom and medication optimization

## 2023-11-01 NOTE — BH INPATIENT PSYCHIATRY PROGRESS NOTE - OTHER
solemn, serious, guarded wide-eye semi-intense (but not hostile) stare with decrease in blinking. limited not able to exclude internal stimuli impaired low end of fair unable to fully ascertain given Pt's guarded and superficial answers

## 2023-11-01 NOTE — BH INPATIENT PSYCHIATRY PROGRESS NOTE - NSBHFUPINTERVALHXFT_PSY_A_CORE
Pt seen and examined. No acute overnight events reported. Pt standing by the water fountain gazing at the nurses station; appears internally preoccupied, consistently flat affect. Pt remains medication compliant, well-tolerated, without any reported side effects. NP titrated Abilify to 15mg as of 10/30. Pt also agreed to Aristada CUEVAS, but was awaiting insurance coverage - Per NP, medicaid approved pt for coverage this morning 11/1. Pt will receive one-time dose of Abilify 30mg tmrw 11/2 and Aristada Initio 675mg tmrw 11/2. Aristada 882mg will be given Friday 11/3. Pt endorses attending group yesterday; psychologist states he volunteered himself to read a passage aloud. Pt is no longer endorsing L heel pain and states he no longer needs his shoes, as he doesn’t want staff to remove the laces. Denies SI/HI, no plan or intent. Denies AH/CAH/VH. Endorses eating well, no missed meals. Endorses sleeping well, woke up briefly once last night d/t snoring roommate. Responses are still very guarded and calculated in general. Does not interact much w/ others on the unit, only really speaks when spoken to. In good behavioral control. NP spoke with patient's family (brother Adams and mother Noreen) at [phone] on 10/30 to discuss treatment progress, medication regimen/CUEVAS and aftercare recommendations. Pt denies any acute complaints or medical problems. Will continue to monitor and provide therapeutic support.

## 2023-11-02 PROCEDURE — 90832 PSYTX W PT 30 MINUTES: CPT

## 2023-11-02 PROCEDURE — 99232 SBSQ HOSP IP/OBS MODERATE 35: CPT

## 2023-11-02 RX ADMIN — ARIPIPRAZOLE 675 MILLIGRAM(S): 15 TABLET ORAL at 10:19

## 2023-11-02 RX ADMIN — Medication 50 MILLIGRAM(S): at 20:01

## 2023-11-02 RX ADMIN — ARIPIPRAZOLE 30 MILLIGRAM(S): 15 TABLET ORAL at 10:18

## 2023-11-02 RX ADMIN — Medication 50 MILLIGRAM(S): at 22:22

## 2023-11-02 NOTE — BH INPATIENT PSYCHIATRY PROGRESS NOTE - NSBHFUPINTERVALHXFT_PSY_A_CORE
Pt seen and examined. No acute overnight events reported. Pt standing by the water fountain gazing at the nurses station; appears internally preoccupied, consistently flat affect. Pt remains medication compliant, well-tolerated, without any reported side effects. This morning 11/2 pt received one-time dose of Abilify 30mg PO and Aristada Initio 675mg injection. Aristada 882mg will be given tmrw 11/3. Pt endorses attending group yesterday; states they discussed metaphors and then played categories. Pt is no longer endorsing L heel pain and states he no longer needs his shoes, as he doesn’t want staff to remove the laces. Denies SI/HI, no plan or intent. Denies AH/CAH/VH. Endorses eating well, no missed meals. Endorses poor sleep last night bc he “didn’t feel tired,” nurse states he slept 1.5 hrs total. Pt attributes this to “staying up late to watch the games with the guys” in the dayroom (which he enjoyed), but nurse states he was pacing up and down the hallway for most of the night. Responses are still very guarded and calculated in general. Does not interact much w/ others on the unit during the day, only really speaks when spoken to. In good behavioral control. NP spoke with patient's family (brother Adams and mother Noreen) at [phone] on 10/30 to discuss treatment progress, medication regimen/CUEVAS and aftercare recommendations. Pt denies any acute complaints or medical problems. Will continue to monitor and provide therapeutic support.

## 2023-11-02 NOTE — BH INPATIENT PSYCHIATRY PROGRESS NOTE - OTHER
not able to exclude internal stimuli wide-eye semi-intense (but not hostile) stare with decrease in blinking. limited low end of fair unable to fully ascertain given Pt's guarded and superficial answers impaired solemn, serious, guarded

## 2023-11-02 NOTE — BH PSYCHOLOGY - CLINICIAN PSYCHOTHERAPY NOTE - NSBHPSYCHOLGOALS_PSY_A_CORE
Decrease symptoms/Assessment/Improve level of independent functioning/Improve social/vocational/coping skills
Decrease symptoms/Assessment/Improve level of independent functioning/Improve social/vocational/coping skills

## 2023-11-02 NOTE — BH PSYCHOLOGY - CLINICIAN PSYCHOTHERAPY NOTE - NSTXDISORGGOAL_PSY_ALL_CORE
Will demonstrate the ability to maintain reality orientation and communicate clearly with others during 2 conversations with staff daily
Will demonstrate the ability to maintain reality orientation and communicate clearly with others during 2 conversations with staff daily

## 2023-11-02 NOTE — BH PSYCHOLOGY - CLINICIAN PSYCHOTHERAPY NOTE - NSBHPSYCHOLINT_PSY_A_CORE
Cognitive/behavioral therapy/Supported coping skills/Supportive therapy
Cognitive/behavioral therapy/Supported coping skills/Supportive therapy

## 2023-11-02 NOTE — BH PSYCHOLOGY - CLINICIAN PSYCHOTHERAPY NOTE - NSBHPSYCHOLNARRATIVE_PSY_A_CORE FT
Pt was adequately groomed, casually dressed. The Pt reported mood as ok, demonstrated mood congruent emotions. Pt's thought process was linear, speech was within normal limits. Pt denied suicidal ideation, plan or intent. Pt did not endorse homicidal ideation. Patient was oriented to person, place and time. No psychotic symptoms noted. PT demonstrated limited insight.    The pt discussed feeling supported by his family but also feeling that they are overprotective at times. Pt explored how he hopes to move forward in his relationships with his family. Pt stated he thinks that he needs a consistent outpatient provider again. Pt believes if he stays connected to outpatient treatment he will remain stable. Pt discussed steps he hopes to take to get back to working at a job. Provided space for pt to share openly. Provided empathy and support. Discussed pt's use of psychology workbook.
Pt was adequately groomed, casually dressed. The Pt reported mood as ok, demonstrated mood congruent emotions. Pt's thought process was linear, speech was within normal limits. Pt denied suicidal ideation, plan or intent. Pt did not endorse homicidal ideation. Patient was oriented to person, place and time. No psychotic symptoms noted. PT demonstrated insight.    The pt discussed reasons for hospitalization. He reported his family was worried about him after he left the house to go to the bank. Pt was unsure why the family was concerned but is glad to be evaluated in the hospital. Pt reported that he was let go from his job one year prior and has been using the time since then to "relax". Pt stated that he spends his time watching TV but that he does not feel isolated. Pt reported he hopes to return to work soon. Explored his family relationships and feelings that have come up since being in the hospital. Provided empathy and support. Encouraged group attendance.

## 2023-11-02 NOTE — BH PSYCHOLOGY - CLINICIAN PSYCHOTHERAPY NOTE - TOKEN PULL-DIAGNOSIS
Primary Diagnosis:  Psychosis [F29]        Problem Dx:   Cannabis abuse [F12.10]      Noncompliance with medications [Z91.148]      Psychosis, unspecified psychosis type [F29]      
Primary Diagnosis:  Psychosis [F29]        Problem Dx:   Cannabis abuse [F12.10]      Noncompliance with medications [Z91.148]      Psychosis, unspecified psychosis type [F29]

## 2023-11-02 NOTE — BH INPATIENT PSYCHIATRY PROGRESS NOTE - CURRENT MEDICATION
MEDICATIONS  (STANDING):  ARIPiprazole 30 milliGRAM(s) Oral once  ARIPiprazole lauroxil Injectable, Long Acting (ARISTADA INITIO) 675 milliGRAM(s) IntraMuscular once  influenza   Vaccine 0.5 milliLiter(s) IntraMuscular once  traZODone 50 milliGRAM(s) Oral at bedtime    MEDICATIONS  (PRN):  chlorproMAZINE    Injectable 50 milliGRAM(s) IntraMuscular once PRN Acute agitation  chlorproMAZINE    Tablet 50 milliGRAM(s) Oral every 6 hours PRN agitation  diphenhydrAMINE 50 milliGRAM(s) Oral at bedtime PRN Insomnia  diphenhydrAMINE Injectable 50 milliGRAM(s) IntraMuscular once PRN Acute agitation  LORazepam     Tablet 2 milliGRAM(s) Oral every 6 hours PRN Agitation  LORazepam   Injectable 2 milliGRAM(s) IntraMuscular once PRN Acute agitation   MEDICATIONS  (STANDING):  traZODone 50 milliGRAM(s) Oral at bedtime    MEDICATIONS  (PRN):  chlorproMAZINE    Injectable 50 milliGRAM(s) IntraMuscular once PRN Acute agitation  chlorproMAZINE    Tablet 50 milliGRAM(s) Oral every 6 hours PRN agitation  diphenhydrAMINE 50 milliGRAM(s) Oral at bedtime PRN Insomnia  diphenhydrAMINE Injectable 50 milliGRAM(s) IntraMuscular once PRN Acute agitation  LORazepam     Tablet 2 milliGRAM(s) Oral every 6 hours PRN Agitation  LORazepam   Injectable 2 milliGRAM(s) IntraMuscular once PRN Acute agitation

## 2023-11-02 NOTE — BH INPATIENT PSYCHIATRY PROGRESS NOTE - NSBHASSESSSUMMFT_PSY_ALL_CORE
Patient is a 28 year old single male, no dependents.  Patient domiciles in private residence with family, currently unemployed.  Patient has PPH diagnosis unknown at this time and history of Cannabis and nicotine abuse.  Patient has 1 prior inpatient hospitalizations at [facility] 2 yrs ago for paranoia.  EMS/911 called by family due to increasing bizarre/erratic  behaviors.   Patient is now re-hospitalized with a primary problem of psychotic decompensation and disorganization.  Patient admitted to [facility] on a 9.39 legal status. Patient requires inpatient hospitalization due to symptoms of mental illness so severe that they significantly interfere with activities of daily living, and presents a potential danger to self as a result of psychotic decompensation. He is requiring 24-hour care at this time as a result, for psychiatric stabilization and safety.    Plan:  >Legal: 9.39  >Obs: Routine, no current SI. no need for CO, patient not expected to pose risk to self or others in controlled inpatient setting  >Psychiatric Meds: Observe for tolerability and efficacy. Patient had been poorly adherent prior to admission.  -Abilify 5mg daily, titrate 10mg on 10/27 --- titrate 15mg on 10/30  -Insurance approved for CUEVAS...  -plan to give one time dose Abilify 30mg on 11/2 with Aristada Initio 675mg  -Aristada 882mg on 11/3  PRN medications:  Ativan 2mg oral Q6HR PRN for agitation and anxiety.  Haldol 5mg oral Q6HR PRN for agitation.   Benadryl 50mg oral Q6HR PRN for agitation.   >Labs:  labs reviewed, no acute findings. Hold antipsychotics if QTc >500  >Medical:   No acute concerns. No consultations needed at this time. No indication for CIWA. Patient with consistently stable VS, no visible physical symptoms of withdrawal.   During the course of treatment, will collaborate with medical team to manage medical issues.  >Diet: Regular  >Social: milieu/structured therapy  >Treatment Interventions: Groups and Individual Therapy/CBT, Motivational counseling for substance abuse related issues.   >Dispo: Collateral and dispo planning pending further symptom and medication optimization

## 2023-11-02 NOTE — BH INPATIENT PSYCHIATRY PROGRESS NOTE - NSBHCHARTREVIEWVS_PSY_A_CORE FT
Vital Signs Last 24 Hrs  T(C): 35.9 (11-02-23 @ 06:00), Max: 36.7 (11-01-23 @ 08:27)  T(F): 96.7 (11-02-23 @ 06:00), Max: 98 (11-01-23 @ 08:27)  HR: --  BP: --  BP(mean): --  RR: 18 (11-01-23 @ 20:50) (18 - 18)  SpO2: --    Orthostatic VS  11-02-23 @ 06:00  Lying BP: --/-- HR: --  Sitting BP: 125/77 HR: 89  Standing BP: 132/81 HR: 103  Site: --  Mode: --  Orthostatic VS  11-01-23 @ 19:05  Lying BP: --/-- HR: --  Sitting BP: 129/76 HR: 90  Standing BP: 130/84 HR: 106  Site: --  Mode: --  Orthostatic VS  11-01-23 @ 08:27  Lying BP: --/-- HR: --  Sitting BP: 104/72 HR: 93  Standing BP: --/-- HR: --  Site: --  Mode: --  Orthostatic VS  10-31-23 @ 18:31  Lying BP: --/-- HR: --  Sitting BP: 114/69 HR: 101  Standing BP: 101/71 HR: 102  Site: --  Mode: --  Orthostatic VS  10-31-23 @ 08:09  Lying BP: --/-- HR: --  Sitting BP: 123/70 HR: 97  Standing BP: 119/78 HR: 117  Site: --  Mode: --   Vital Signs Last 24 Hrs  T(C): 35.9 (11-02-23 @ 06:00), Max: 36.7 (11-01-23 @ 19:05)  T(F): 96.7 (11-02-23 @ 06:00), Max: 98 (11-01-23 @ 19:05)  HR: --  BP: --  BP(mean): --  RR: 18 (11-01-23 @ 20:50) (18 - 18)  SpO2: --    Orthostatic VS  11-02-23 @ 06:00  Lying BP: --/-- HR: --  Sitting BP: 125/77 HR: 89  Standing BP: 132/81 HR: 103  Site: --  Mode: --  Orthostatic VS  11-01-23 @ 19:05  Lying BP: --/-- HR: --  Sitting BP: 129/76 HR: 90  Standing BP: 130/84 HR: 106  Site: --  Mode: --  Orthostatic VS  11-01-23 @ 08:27  Lying BP: --/-- HR: --  Sitting BP: 104/72 HR: 93  Standing BP: --/-- HR: --  Site: --  Mode: --  Orthostatic VS  10-31-23 @ 18:31  Lying BP: --/-- HR: --  Sitting BP: 114/69 HR: 101  Standing BP: 101/71 HR: 102  Site: --  Mode: --

## 2023-11-03 PROCEDURE — 99232 SBSQ HOSP IP/OBS MODERATE 35: CPT

## 2023-11-03 PROCEDURE — 90853 GROUP PSYCHOTHERAPY: CPT

## 2023-11-03 RX ADMIN — Medication 50 MILLIGRAM(S): at 20:14

## 2023-11-03 RX ADMIN — ARIPIPRAZOLE 882 MILLIGRAM(S): 15 TABLET ORAL at 09:07

## 2023-11-03 NOTE — BH INPATIENT PSYCHIATRY PROGRESS NOTE - NSBHASSESSSUMMFT_PSY_ALL_CORE
Patient is a 28 year old single male, no dependents.  Patient domiciles in private residence with family, currently unemployed.  Patient has PPH diagnosis unknown at this time and history of Cannabis and nicotine abuse.  Patient has 1 prior inpatient hospitalizations at [facility] 2 yrs ago for paranoia.  EMS/911 called by family due to increasing bizarre/erratic  behaviors.   Patient is now re-hospitalized with a primary problem of psychotic decompensation and disorganization.  Patient admitted to [facility] on a 9.39 legal status. Patient requires inpatient hospitalization due to symptoms of mental illness so severe that they significantly interfere with activities of daily living, and presents a potential danger to self as a result of psychotic decompensation. He is requiring 24-hour care at this time as a result, for psychiatric stabilization and safety.    Plan:  >Legal: 9.39  >Obs: Routine, no current SI. no need for CO, patient not expected to pose risk to self or others in controlled inpatient setting  >Psychiatric Meds: Observe for tolerability and efficacy. Patient had been poorly adherent prior to admission.  -Abilify 5mg daily, titrate 10mg on 10/27 --- titrate 15mg on 10/30  -Insurance approved for CUEVAS...  -plan to give one time dose Abilify 30mg on 11/2 with Aristada Initio 675mg  -Aristada 882mg on 11/3  PRN medications:  Ativan 2mg oral Q6HR PRN for agitation and anxiety.  Haldol 5mg oral Q6HR PRN for agitation.   Benadryl 50mg oral Q6HR PRN for agitation.   >Labs:  labs reviewed, no acute findings. Hold antipsychotics if QTc >500  >Medical:   No acute concerns. No consultations needed at this time. No indication for CIWA. Patient with consistently stable VS, no visible physical symptoms of withdrawal.   During the course of treatment, will collaborate with medical team to manage medical issues.  >Diet: Regular  >Social: milieu/structured therapy  >Treatment Interventions: Groups and Individual Therapy/CBT, Motivational counseling for substance abuse related issues.   >Dispo: Collateral and dispo planning pending further symptom and medication optimization. Working on dc for early next week 11/8

## 2023-11-03 NOTE — BH INPATIENT PSYCHIATRY PROGRESS NOTE - NSBHFUPINTERVALHXFT_PSY_A_CORE
Pt seen and examined. No acute overnight events reported. Pt standing by the water fountain gazing at the nurses station; appears internally preoccupied, consistently flat affect. Pt remains medication compliant, well-tolerated, without any reported side effects. Yesterday 11/2 pt received one-time dose of Abilify 30mg PO and Aristada Initio 675mg injection. Pt states he initially felt weak yesterday but is feeling normal today. Aristada 882mg IM injection was given today 11/3. Pt endorses attending group yesterday; states they played “Guess Who,” which was fun. Denies SI/HI, no plan or intent. Denies AH/CAH/VH. Endorses eating well, no missed meals. Endorses sleeping well after being given PRN Benadryl 50mg. Responses are still very guarded and calculated in general. Does not interact much w/ others on the unit during the day, only really speaks when spoken to. In good behavioral control. NP spoke with patient's family (brother Adams and mother Noreen) at [phone] on 10/30 to discuss treatment progress, medication regimen/CUEVAS and aftercare recommendations. SW sent referral to [facility] for outpt f/u upon d/c, which is tentatively scheduled for Wednesday 11/8. Pt denies any acute complaints or medical problems. Will continue to monitor and provide therapeutic support.

## 2023-11-03 NOTE — BH INPATIENT PSYCHIATRY PROGRESS NOTE - NSBHCHARTREVIEWVS_PSY_A_CORE FT
Vital Signs Last 24 Hrs  T(C): 36.3 (11-02-23 @ 19:27), Max: 36.3 (11-02-23 @ 19:27)  T(F): 97.4 (11-02-23 @ 19:27), Max: 97.4 (11-02-23 @ 19:27)  HR: --  BP: --  BP(mean): --  RR: 16 (11-02-23 @ 19:27) (16 - 16)  SpO2: --    Orthostatic VS  11-02-23 @ 19:27  Lying BP: --/-- HR: --  Sitting BP: 127/83 HR: 102  Standing BP: 142/96 HR: 119  Site: --  Mode: --  Orthostatic VS  11-02-23 @ 06:00  Lying BP: --/-- HR: --  Sitting BP: 125/77 HR: 89  Standing BP: 132/81 HR: 103  Site: --  Mode: --  Orthostatic VS  11-01-23 @ 19:05  Lying BP: --/-- HR: --  Sitting BP: 129/76 HR: 90  Standing BP: 130/84 HR: 106  Site: --  Mode: --  Orthostatic VS  11-01-23 @ 08:27  Lying BP: --/-- HR: --  Sitting BP: 104/72 HR: 93  Standing BP: --/-- HR: --  Site: --  Mode: --   Vital Signs Last 24 Hrs  T(C): 36.7 (11-03-23 @ 08:26), Max: 36.7 (11-03-23 @ 08:26)  T(F): 98 (11-03-23 @ 08:26), Max: 98 (11-03-23 @ 08:26)  HR: --  BP: --  BP(mean): --  RR: 16 (11-02-23 @ 19:27) (16 - 16)  SpO2: --    Orthostatic VS  11-03-23 @ 08:26  Lying BP: --/-- HR: --  Sitting BP: 117/76 HR: 99  Standing BP: 114/74 HR: 109  Site: --  Mode: --  Orthostatic VS  11-02-23 @ 19:27  Lying BP: --/-- HR: --  Sitting BP: 127/83 HR: 102  Standing BP: 142/96 HR: 119  Site: --  Mode: --  Orthostatic VS  11-02-23 @ 06:00  Lying BP: --/-- HR: --  Sitting BP: 125/77 HR: 89  Standing BP: 132/81 HR: 103  Site: --  Mode: --  Orthostatic VS  11-01-23 @ 19:05  Lying BP: --/-- HR: --  Sitting BP: 129/76 HR: 90  Standing BP: 130/84 HR: 106  Site: --  Mode: --

## 2023-11-03 NOTE — BH TREATMENT PLAN - NSTXPLANTHERAPYSESSIONSFT_PSY_ALL_CORE
11-01-23  Type of therapy: Coping skills, Creative arts therapy, Leisure development, Stress management  Type of session: Individual  Level of patient participation: Attentive, Engaged, Participates  Duration of participation: 30 minutes  Therapy conducted by: Psych rehab  Therapy Summary: Pt has demonstrated daily compliant with medication. Pt has verbally agreed to comply with medication post-discharge. Pt has not verbalized benefits of medication compliance. Pt has minimized all symptom. Over this past week, pt was observed to be guarded, demonstrated good behavioral control. Pt showed approximately 20% of group attendance. During the group session, pt was able to tolerate group structure, participated with prompting. pt was observed to be visible on the unit, showed minimal interaction with others, pacing at times over this past week. Pt showed fair ADLs. Pt has participated in his safety plan. Writer will update his safety plan in the chart.

## 2023-11-03 NOTE — BH PSYCHOLOGY - GROUP THERAPY NOTE - NSBHPSYCHOLRESPCOMMENT_PSY_A_CORE FT
Pt appeared adequately groomed and casually dressed. Pt shared that he likes to exercise to cope with distressing thoughts and feelings. Pt appeared adequately groomed and casually dressed. During check-in, pt shared that he would like to tell his younger self to express his feelings more towards others. Pt noted that he likes to exercise to cope with distressing thoughts and feelings.

## 2023-11-03 NOTE — BH INPATIENT PSYCHIATRY PROGRESS NOTE - CURRENT MEDICATION
MEDICATIONS  (STANDING):  ARIPiprazole lauroxil Injectable, Long Acting (ARISTADA) 882 milliGRAM(s) IntraMuscular once  traZODone 50 milliGRAM(s) Oral at bedtime    MEDICATIONS  (PRN):  chlorproMAZINE    Injectable 50 milliGRAM(s) IntraMuscular once PRN Acute agitation  chlorproMAZINE    Tablet 50 milliGRAM(s) Oral every 6 hours PRN agitation  diphenhydrAMINE 50 milliGRAM(s) Oral at bedtime PRN Insomnia  diphenhydrAMINE Injectable 50 milliGRAM(s) IntraMuscular once PRN Acute agitation  LORazepam     Tablet 2 milliGRAM(s) Oral every 6 hours PRN Anxiety/aggression  LORazepam   Injectable 2 milliGRAM(s) IntraMuscular once PRN Acute Aggression   MEDICATIONS  (STANDING):  traZODone 50 milliGRAM(s) Oral at bedtime    MEDICATIONS  (PRN):  chlorproMAZINE    Injectable 50 milliGRAM(s) IntraMuscular once PRN Acute agitation  chlorproMAZINE    Tablet 50 milliGRAM(s) Oral every 6 hours PRN agitation  diphenhydrAMINE 50 milliGRAM(s) Oral at bedtime PRN Insomnia  diphenhydrAMINE Injectable 50 milliGRAM(s) IntraMuscular once PRN Acute agitation  LORazepam     Tablet 2 milliGRAM(s) Oral every 6 hours PRN Anxiety/aggression  LORazepam   Injectable 2 milliGRAM(s) IntraMuscular once PRN Acute Aggression

## 2023-11-03 NOTE — BH TREATMENT PLAN - NSCMSPTSTRENGTHS_PSY_ALL_CORE
Strong support system/Supportive family
Compliance to treatment/Physically healthy/Positive attitude/Strong support system/Supportive family

## 2023-11-03 NOTE — BH TREATMENT PLAN - NSDCCRITERIA_PSY_ALL_CORE
Symptom Stabilization  CGI<=3  Outpatient services f/u  Consider CUEVAS  
Symptom Stabilization  CGI<=3  Outpatient services f/u  Consider CUEVAS (Aristada)

## 2023-11-03 NOTE — BH INPATIENT PSYCHIATRY PROGRESS NOTE - OTHER
unable to fully ascertain given Pt's guarded and superficial answers solemn, serious, guarded not able to exclude internal stimuli wide-eye semi-intense (but not hostile) stare with decrease in blinking.

## 2023-11-03 NOTE — BH TREATMENT PLAN - NSTXCAREGIVERPARTICIPATE_PSY_P_CORE
No, patient unwilling to involve family/caregiver
Family/Caregiver participated in identification of needs/problems/goals for treatment/Family/Caregiver participated in development of after care plan

## 2023-11-03 NOTE — BH TREATMENT PLAN - NSTXMEDICINTERPR_PSY_ALL_CORE
Pt met his goal over this past week. Pt has demonstrated daily compliant with medication regimen and dosage. Psychiatric rehabilitation staff will encourage pt to explore possible benefits of medication compliance.
Pt would benefit from demonstrating medication compliance by day seven. Pt would benefit from attending daily psychiatric rehabilitation group and engage in individual therapy session in order to achieve his goal.

## 2023-11-04 RX ADMIN — Medication 50 MILLIGRAM(S): at 20:19

## 2023-11-04 RX ADMIN — Medication 2 MILLIGRAM(S): at 20:19

## 2023-11-05 RX ADMIN — Medication 2 MILLIGRAM(S): at 23:07

## 2023-11-05 RX ADMIN — Medication 50 MILLIGRAM(S): at 20:40

## 2023-11-06 PROCEDURE — 90853 GROUP PSYCHOTHERAPY: CPT

## 2023-11-06 PROCEDURE — 99238 HOSP IP/OBS DSCHRG MGMT 30/<: CPT

## 2023-11-06 RX ADMIN — Medication 50 MILLIGRAM(S): at 20:51

## 2023-11-06 RX ADMIN — Medication 2 MILLIGRAM(S): at 20:51

## 2023-11-06 NOTE — BH INPATIENT PSYCHIATRY DISCHARGE NOTE - HOSPITAL COURSE
Patient is a 28 year old single male, no dependents.  Patient domiciles in private residence with family, currently unemployed.  Patient has PPH diagnosis unknown at this time and history of Cannabis and nicotine abuse.  Patient has 1 prior inpatient hospitalizations at [facility] 2 yrs ago for paranoia.  EMS/911 called by family due to increasing bizarre/erratic  behaviors.   Patient hospitalized with a primary problem of psychotic decompensation and disorganization.  Patient admitted to [facility] on a 9.39 legal status.     On admission patient presented cooperative, very guarded and suspicious.  Patient volunteered little information.  TP somewhat  disorganized. He denied  SI/SIB/HI on the unit. Although he denied AVH, paranoia, delusions, patient was disorganized, and appeared internally preoccupied, with blunted affect, oddly related.   No signs of yane, IOR, or magical thinking.  No evidence of substance misuse, admitting utox negative.      Following a  discussion regarding risks (including but not limited to EPS, weight gain, NMS, TD, metabolic syndrome) and benefits (attenuated psychosis, more organized thought process and behavior), patient was started  on medications,  responded well to Abilify, titrated to 15mg daily.  Patient consented to CUEVAS and received Aristada Initio on 11/ followed with Aristada 882ng on , tolerated well (Abilify tabs were discontinued).  Patient had no  reported or observed adverse effects, such as akathisia, tremors or EPS.  Patient's clinical symptoms attenuated significantly patient shows less overt manifestation, with clearer thought process. Patient reported improved sleep and appetite.      Patient remained in good behavioral control, he did not require emergent intramuscular medications or seclusion / restraints, no po prn medications for aggression. Patient  attended group therapy occasionally and  participated appropriately.  Patient was provided with extensive psycho-education regarding importance of compliance with medications.  Risk and benefits discussed.  Provided with motivational counseling regarding substance related issues. He remained compliant with medications and actively engaged in treatment interventions.  Provided with literature on "safety plan"     Communication with family (mother Noreen and brother ) was continuous throughout his course of treatment. Provider spoke with patient’s family discussed  treatment goals and progress, medication regimen and CUEVAS discussed  and outpatient treatment plan and aftercare recommendations.      Problem Pertinent Review of Symptoms/Associated Signs and Symptoms: Patient is visible on the unit and is calm, cooperative, AAOX3 upon approach. Immediate risk was minimized by inpatient admission to a safe environment with appropriate supervision and limited access to lethal means. On suicide risk assessment at the time of discharge, patient is at elevated chronic risk due the following factors: history of psychiatric illness, history on noncompliance, hx of multiple inpatient psychiatric admissions, and recent inpatient psychiatric discharge.    Protective factors include patient denying any anxiety, panic attacks, global insomnia, severe anhedonia.  Patient denies any suicidal/homicidal ideations, intent, or plan at the time of discharge.  Psycho education was provided to the patient prior to discharge. The patient was educated about the proper and safe use of medications as well as the risks and benefits of over and under dosing. Discussed pitfalls of treatment failure and reinforced taking medication as directed. Discussed not stopping medications when he feels better, and processed discontinuation symptoms. Patient was informed of risk/benefit of medication, alternative treatment and no treatment.  Patient was educated about side effects of his medications, including EPS, TD.  Patient verbalized understanding and in accord with aftercare recommendations. Given the mitigation of aforementioned acute risk factors and considerable protective factors, patient's acute risk for self-harm has been minimized and is currently low.      Patient able to identify protective factors, patient is future-oriented.  Patient no longer required inpatient treatment and care. Patient is not judged to be an acute danger to self or others at this time. Appeared ready and stable to be discharged to lower level of care.  By day of discharge, the patient reported feeling significantly better.  There were no other acute risk factors that could be mitigated by further inpatient hospitalization.   PROCEDURES AND TREATMENT:  >Individual psychotherapy/CBT modality and group therapy  >Milieu therapy and supportive therapy  >Psychopharmacologic management.  >Motivational counseling.   Patient labs were all WNL. Labs include---CBC/Chemistry/LFT/A1C/Lipid. Panel/TSH/CPK/HBA1-C/U-A/U-Tox/EKG/COVID  EKG: NSR   Qt/QTC:  Covid at discharge: nondetectable   Consultation: NA  Medical Issues: NA  Imaging: Na    Medications at discharge:   Psychiatric: Aristada 882mg Q4 weeks last received on 11/3/23, Trazodone 50mg qhs prn    On the day of discharge, the patient’s mood and affect are normal/euthymic. Patient denies depressed mood and anxiety. Patient is not hopeless. Sleep and appetite are also normal (at baseline).  Pt’s motor performance and productivity of speech are WNL. Pt denies symptoms of psychosis including AVH and is at baseline. Patient denies S/H/I/I/P. There are no other acute risk factors that could be mitigated by further inpatient hospitalization.  Patient is not deemed to be an imminent danger to self or others at the time of discharge.  The patient has been instructed that should he develop thoughts of self-harm, suicidal thoughts, homicidal thoughts, aggression, agitation or any other distressing psychiatric symptoms, he should call 911 or go the emergency room. The patient has expressed understanding and is in agreement with the above plan.   Discharge Pan:  -Risk, benefits and alternatives discussed with patient. Patient verbalized understanding and in accord with aftercare recommendations.   - Patient given 4 weeks supply of medications. Risk, benefits and alternatives discussed with patient.   -Patient instructed to call 911 or go to nearest ER in case of emergency.   -Patient given Suicide Prevention Lifeline number 1-230.360.3040 and provided instructions on its use  -Patient will follow up with outpatient appointments             Patient is a 28 year old single male, no dependents.  Patient domiciles in private residence with family, currently unemployed.  Patient has PPH diagnosis unknown at this time and history of Cannabis and nicotine abuse.  Patient has 1 prior inpatient hospitalizations at [facility] 2 yrs ago for paranoia.  EMS/911 called by family due to increasing bizarre/erratic  behaviors.   Patient hospitalized with a primary problem of psychotic decompensation and disorganization.  Patient admitted to [facility] on a 9.39 legal status.     On admission patient presented cooperative, very guarded and suspicious.  Patient volunteered little information.  TP somewhat  disorganized. He denied  SI/SIB/HI on the unit. Although he denied AVH, paranoia, delusions, patient was disorganized, and appeared internally preoccupied, with blunted affect, oddly related.   No signs of yane, IOR, or magical thinking.  No evidence of substance misuse, admitting utox negative.      Following a  discussion regarding risks (including but not limited to EPS, weight gain, NMS, TD, metabolic syndrome) and benefits (attenuated psychosis, more organized thought process and behavior), patient was started  on medications,  responded well to Abilify, titrated to 15mg daily.  Patient consented to CUEVAS and received Aristada Initio on 11/ followed with Aristada 882ng on , tolerated well (Abilify tabs were discontinued).  Patient had no  reported or observed adverse effects, such as akathisia, tremors or EPS.  Patient's clinical symptoms attenuated significantly patient shows less overt manifestation, with clearer thought process. Patient reported improved sleep and appetite.      Patient remained in good behavioral control, he did not require emergent intramuscular medications or seclusion / restraints, no po prn medications for aggression. Patient  attended group therapy occasionally and  participated appropriately.  Patient was provided with extensive psycho-education regarding importance of compliance with medications.  Risk and benefits discussed.  Provided with motivational counseling regarding substance related issues. He remained compliant with medications and actively engaged in treatment interventions.  Provided with literature on "safety plan"     Communication with family (mother Noreen and brother ) was continuous throughout his course of treatment. Provider spoke with patient’s family discussed  treatment goals and progress, medication regimen and CUEVAS discussed  and outpatient treatment plan and aftercare recommendations.      Problem Pertinent Review of Symptoms/Associated Signs and Symptoms: Patient is visible on the unit and is calm, cooperative, AAOX3 upon approach. Immediate risk was minimized by inpatient admission to a safe environment with appropriate supervision and limited access to lethal means. On suicide risk assessment at the time of discharge, patient is at elevated chronic risk due the following factors: history of psychiatric illness, history on noncompliance, hx of multiple inpatient psychiatric admissions, and recent inpatient psychiatric discharge.    Protective factors include patient denying any anxiety, panic attacks, global insomnia, severe anhedonia.  Patient denies any suicidal/homicidal ideations, intent, or plan at the time of discharge.  Psycho education was provided to the patient prior to discharge. The patient was educated about the proper and safe use of medications as well as the risks and benefits of over and under dosing. Discussed pitfalls of treatment failure and reinforced taking medication as directed. Discussed not stopping medications when he feels better, and processed discontinuation symptoms. Patient was informed of risk/benefit of medication, alternative treatment and no treatment.  Patient was educated about side effects of his medications, including EPS, TD.  Patient verbalized understanding and in accord with aftercare recommendations. Given the mitigation of aforementioned acute risk factors and considerable protective factors, patient's acute risk for self-harm has been minimized and is currently low.      Patient able to identify protective factors, patient is future-oriented.  Patient no longer required inpatient treatment and care. Patient is not judged to be an acute danger to self or others at this time. Appeared ready and stable to be discharged to lower level of care.  By day of discharge, the patient reported feeling significantly better.  There were no other acute risk factors that could be mitigated by further inpatient hospitalization.   PROCEDURES AND TREATMENT:  >Individual psychotherapy/CBT modality and group therapy  >Milieu therapy and supportive therapy  >Psychopharmacologic management.  >Motivational counseling.   Patient labs were all WNL. Labs include---CBC/Chemistry/LFT/A1C/Lipid. Panel/TSH/CPK/HBA1-C/U-A/U-Tox/EKG/COVID  EKG: NSR   Qt/QTC:  Covid at discharge: nondetectable   Consultation: NA  Medical Issues: NA  Imaging: NA    Medications at discharge:   Psychiatric: Aristada 882mg Q4 weeks last received on 11/3/23, Trazodone 50mg qhs prn    On the day of discharge, the patient’s mood and affect are normal/euthymic. Patient denies depressed mood and anxiety. Patient is not hopeless. Sleep and appetite are also normal (at baseline).  Pt’s motor performance and productivity of speech are WNL. Pt denies symptoms of psychosis including AVH and is at baseline. Patient denies S/H/I/I/P. There are no other acute risk factors that could be mitigated by further inpatient hospitalization.  Patient is not deemed to be an imminent danger to self or others at the time of discharge.  The patient has been instructed that should he develop thoughts of self-harm, suicidal thoughts, homicidal thoughts, aggression, agitation or any other distressing psychiatric symptoms, he should call 911 or go the emergency room. The patient has expressed understanding and is in agreement with the above plan.   Discharge Pan:  -Risk, benefits and alternatives discussed with patient. Patient verbalized understanding and in accord with aftercare recommendations.   - Patient given 4 weeks supply of medications. Risk, benefits and alternatives discussed with patient.   -Patient instructed to call 911 or go to nearest ER in case of emergency.   -Patient given Suicide Prevention Lifeline number 2-947-764-4427 and provided instructions on its use  -Patient will follow up with outpatient appointments at [facility]

## 2023-11-06 NOTE — BH INPATIENT PSYCHIATRY DISCHARGE NOTE - NSBHASSESSSUMMFT_PSY_ALL_CORE
Patient seen individually for discharge day management. The patient's case has been reviewed with the treatment team.  Spent performing discharge risk assessment, safety planning, performing clinical eval, planning for dc and providing psychoeducation about meds, sx and aftercare.     On exam today the patient was cooperative and makes good eye contact. His symptoms of psychotic process have lessened and he  reports feeling better. His sleep patterns have improved, with good appetite. He denies suicidal thoughts no plan or intent. Denies HI/AVH, delusions, or other symptoms of psychotic process are reported at this time.      He showed  improvement in his symptoms, with a gradual reduction of his internal preoccupation, paranoia, and thought disorganization. Patient has actively engaged in treatment to maximize treatment benefits. He has attended therapy groups.  The patient was provided with motivational counseling to  tackle stressors and enhance coping skills,  he is encouraged to abstain from illicit substances, as these can directly worsen his mood and symptoms.   He has remained compliant with medications, no adverse effects noted.  Medication teaching, risk/benefits of CUEVAS discussed.  He agreed to CUEVAS, received Aristada 882mg during course of treatment. He has remained in good behavioral control and has not required emergent intramuscular medications or seclusion / restraints.  He denied any suicidal or homicidal ideations throughout hospitalization. Patient is able to care for self. Improved level of functioning is evident, with appropriate interaction with others. Patient able to identify protective factors, patient is future-oriented.   Patient no longer requires inpatient treatment and care. Patient is not judged to be an acute danger to self or others at this time. Appears ready and stable to be discharged to lower level of care. There are no other acute risk factors that could be mitigated by further inpatient hospitalization.    He will be discharged today to home and outpatient follow up.  Patient left under no distress, accompanied by brother. Patient seen individually for discharge day management. The patient's case has been reviewed with the treatment team.  Spent performing discharge risk assessment, safety planning, performing clinical eval, planning for dc and providing psychoeducation about meds, sx and aftercare.     On exam today the patient was cooperative and makes good eye contact. His symptoms of psychotic process have lessened and he  reports feeling better. His sleep patterns have improved, with good appetite. He denies suicidal thoughts no plan or intent. Denies HI/AVH, delusions, or other symptoms of psychotic process are reported at this time.      He showed  improvement in his symptoms, with a gradual reduction of his internal preoccupation, paranoia, and thought disorganization. Patient has actively engaged in treatment to maximize treatment benefits. He has attended therapy groups.  The patient was provided with motivational counseling to  tackle stressors and enhance coping skills,  he is encouraged to abstain from illicit substances, as these can directly worsen his mood and symptoms.   He has remained compliant with medications, no adverse effects noted.  Medication teaching, risk/benefits of CUEVAS discussed.  He agreed to CUEVAS, received Aristada 882mg during course of treatment on 11/3. He has remained in good behavioral control and has not required emergent intramuscular medications or seclusion / restraints.  He denied any suicidal or homicidal ideations throughout hospitalization. Patient is able to care for self. Improved level of functioning is evident, with appropriate interaction with others. Patient able to identify protective factors, patient is future-oriented.   Patient no longer requires inpatient treatment and care. Patient is not judged to be an acute danger to self or others at this time. Appears ready and stable to be discharged to lower level of care. There are no other acute risk factors that could be mitigated by further inpatient hospitalization.    He will be discharged today to home and outpatient follow up.  Patient left under no distress, accompanied by brother.

## 2023-11-06 NOTE — BH INPATIENT PSYCHIATRY DISCHARGE NOTE - HPI (INCLUDE ILLNESS QUALITY, SEVERITY, DURATION, TIMING, CONTEXT, MODIFYING FACTORS, ASSOCIATED SIGNS AND SYMPTOMS)
Patient was seen and evaluated, chart, medications and labs reviewed. Case discussed with nursing team.  On service for this 28 year old single male, no dependents.  Patient domiciles in private residence with family, currently unemployed.  Patient has PPH diagnosis unknown at this time and history of Cannabis and nicotine abuse.  Patient has 1 prior inpatient hospitalizations at [facility] 2 yrs ago for paranoia.  EMS/911 called by family due to increasing bizarre/erratic  behaviors.   Patient is now re-hospitalized with a primary problem of psychotic decompensation and disorganization.  Patient admitted to [facility] on a 9.39 legal status. I have reviewed the initial psychiatric assessment in the electronic medical record, including the history of present illness, past psychiatric history, family/social history (no pertinent changes), and exam, and have confirmed the salient findings dated  10/24/23.  As per chart review, transferring records indicated the [place]  M, single, noncaregiver, childless, never , not in school, unemployed, domiciled with mother and brother, at baseline described as friendly, worked in [employer] and can have a full conversation, started to show behavioral/functional changes for the past 1-2 years (ie. isolating, not able to work, spends his days watching TV, walks to local  to get his cigarettes and smokes a lot), has 1 prior inpatient psychiatric admission x 1 week at [facility] 2 years ago (due to paranoia; Pt was fearful people was trying to get him and was running out into the highway; given Celexa, haldol), hx of Cannabis use, with reported hx of Psychosis (exact previous psychiatric diagnosis not known at this time), was attending outpatient mental health services until 1 year ago, not on any psychiatric medications at this time, has no known hx of illicit substance use, who was BIB EMS today for increasing disorganized, bizarre behavior. 911 was called by his family. Namely, Patient ventured out for his usual  stint but kept going. Patient's family followed at this time due to him acting increasingly oddly in the preceding week. Patient told them that he was walking to [employer] (from [place]) to apply for jobs (these job & interviews do not exist). Family noted that he has been more paranoid and disorganized since Friday 10/20/2023.. Since Friday pt has been praying 5-10x a day and for as long as 40 minutes which is not his norm. + tearful, reported feeling scared and Pt asked his mother to sleep with him at night. + poor ADLs - Pt is sleeping in his same regular clothes since Friday, sleeping poorly, and barely eating and his last haircut was 1 year ago. + Patient has been more restless and not sitting on the couch as he usually does when watching TV. + Patient noted to be whispering to himself. Family is advocating for psychiatric admission.   EXAM: calm, cooperative, sitting on the bed with a wide-eye semi-intense (but not hostile) stare with decrease in blinking. Polite, answers all questions in a superficial, lacking in detail manner. For example: "I went to [place]." Q: "for money?" A: "yes." Q: "to buy what?" A: " things." Patient says that he has been doing fine, sleeping, eating ok, likes watching "Friends" on TV although he considers himself a loner and does not want friends of his own. Patient denies paranoia such as being followed, watched etc, Says his mother and brother "worried about me and followed me on the street and then were yelling." Patient says he was brought here to make sure "tests are ok." Says he was at [facility] because of a "nervous breakdown," was put on sertraline and haldol which "made me feel lightheaded." Denies that he has been more Yazidi as of late and says "I believe in Jainism." Denies any suicidal ideation, intent or plan; denies any thoughts of hurting anyone else. Denies any substance use including cannabis.   COLLATERAL FROM Pt’s Mom Emilia [phone] and brother Adams [phone].    On unit: information came from chart review and patient interview  Patient is seen privately for session. Patient presents calm, cooperative but very suspicious and guarded, answers to interview questions are calculated.  Patient provides minimally not providing much information.  When asked about precipitants leading to admission pt replies "I went to [employer] and my mother and brother followed me..that's about it"  When asked why his family would follow him to the bank pt stated "they do that a lot I don't know why they follow me"  Patient denies any mood dysregulation, denies  low mood or anxiety. Patient denies AVH and denies all family collateral information.  Pt declines to give consent to family members.  Patient reported that his mood to be  "fine  nothing is wrong”  Patient denies symptoms of: anhedonia, poor appetite (lost ..lbs), at times feels hopeless/emptiness, crying episodes, fatigue, insomnia, poor concentration.   Patient denies any interference  with his thinking and functionality.  Denies any current or past suicidal thoughts, or negative thoughts to self-harm. No thoughts to harm others.  Patient reports he is just dealing with recently moving into [place].  States he lost his job and is now residing with mother  Patient denies any hx or current AVH, delusions, psychotic disorganization, mind reading abilities, thought insertion, ideas of reference, special segura, or thought broadcasting or paranoia. Denies history of aggression or violence. No access to firearm. Patient denies any symptoms suggestive of active yane: (denied grandiosity/ racing thoughts/ increased goal directed activities or engaged in risk taking behavior/ no pressured speech/ no elevated mood/ denied any increased in energy level causing sleep disruption), no signs of catatonia (with no signs of mutism, negativism, stereotypy, echolalia, echopraxia, posturing or rigidity. He denies obsessive, intrusive and persistent thoughts or compulsive, ritualistic acts are reported. Patient denies any active legal issues, is not currently under any kind of court supervision. Reports past hx of DWI at age 22.  Denies substance use, no alcohol, no vaping, reports 1pack cigs every 2 days, (pt declined smoking cessation interventions),  reviewed admitting u-tox is negative.  Patient  denied past  trauma. No PMH.

## 2023-11-06 NOTE — BH INPATIENT PSYCHIATRY PROGRESS NOTE - NSBHCHARTREVIEWVS_PSY_A_CORE FT
Vital Signs Last 24 Hrs  T(C): 36.4 (11-05-23 @ 19:51), Max: 36.4 (11-05-23 @ 07:42)  T(F): 97.5 (11-05-23 @ 19:51), Max: 97.5 (11-05-23 @ 07:42)  HR: --  BP: --  BP(mean): --  RR: 18 (11-05-23 @ 07:42) (18 - 18)  SpO2: --    Orthostatic VS  11-05-23 @ 19:51  Lying BP: --/-- HR: --  Sitting BP: 139/79 HR: 96  Standing BP: 131/80 HR: 110  Site: upper left arm  Mode: electronic  Orthostatic VS  11-05-23 @ 07:42  Lying BP: --/-- HR: --  Sitting BP: 139/79 HR: 96  Standing BP: 131/80 HR: 110  Site: --  Mode: --  Orthostatic VS  11-04-23 @ 19:26  Lying BP: --/-- HR: --  Sitting BP: 120/73 HR: 102  Standing BP: 116/74 HR: 120  Site: --  Mode: --  Orthostatic VS  11-04-23 @ 08:38  Lying BP: --/-- HR: --  Sitting BP: 115/77 HR: 81  Standing BP: 120/74 HR: 83  Site: --  Mode: --   Vital Signs Last 24 Hrs  T(C): 36.7 (11-06-23 @ 08:49), Max: 36.7 (11-06-23 @ 08:49)  T(F): 98.1 (11-06-23 @ 08:49), Max: 98.1 (11-06-23 @ 08:49)  HR: 100 (11-06-23 @ 08:54) (100 - 100)  BP: --  BP(mean): --  RR: 17 (11-06-23 @ 07:14) (17 - 17)  SpO2: --    Orthostatic VS  11-06-23 @ 08:49  Lying BP: --/-- HR: --  Sitting BP: 118/72 HR: 115  Standing BP: --/-- HR: --  Site: --  Mode: --  Orthostatic VS  11-05-23 @ 19:51  Lying BP: --/-- HR: --  Sitting BP: 139/79 HR: 96  Standing BP: 131/80 HR: 110  Site: upper left arm  Mode: electronic  Orthostatic VS  11-05-23 @ 07:42  Lying BP: --/-- HR: --  Sitting BP: 139/79 HR: 96  Standing BP: 131/80 HR: 110  Site: --  Mode: --  Orthostatic VS  11-04-23 @ 19:26  Lying BP: --/-- HR: --  Sitting BP: 120/73 HR: 102  Standing BP: 116/74 HR: 120  Site: --  Mode: --

## 2023-11-06 NOTE — BH INPATIENT PSYCHIATRY DISCHARGE NOTE - REASON FOR ADMISSION
Patient hospitalized with a primary problem of psychotic decompensation and disorganization, increasing bizarre/erratic  behaviors.

## 2023-11-06 NOTE — BH INPATIENT PSYCHIATRY DISCHARGE NOTE - ATTENDING DISCHARGE PHYSICAL EXAMINATION:
I have personally seen and evaluated patient prior to discharge. Chart reviewed and discharge plan discussed with the PA student/NP as follows. Pt prior to discharge was calm, cooperative, friendly and smiling. Patient has been attending groups with active participation.  Pt interacting well with others. No recent behavioral problems and no episodes of aggressive or dangerous behavior. No problems with sleep, appetite or energy level. Denied any active and current manic, hypomanic, depressive, psychotic or anxiety symptoms. Denied any current SI/HI/AH/VH/PI. No overt delusions.  Patient is organized and commits to safety and to ongoing outpatient treatment.   Pt asks relevant questions about his discharge plan and about the medication regimen. Pt articulate a plan for living life after discharge. Medication risks/benefits/side effects were discussed with the patient.  Patient expressed understanding and agreed with the treatment plan. Further, instructed the patient to seek help immediately by dialing "911" or by going to the nearest ER if symptoms worsen.   Chronic risk factors include hx of schizophrenia, prior psych admissions.   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  As such, its chronic risk factors are mitigated by their protective factors. Pt does not pose an acute elevated risk of imminent danger to harm to self or others. Does not require further inpatient psychiatric admisison at this time. Psychiatrically cleared for discharge.   Pt urged to avoid using any alcohol or street drugs, particularly marijuana & K2, cocaine and methamphetamine (crystal meth) once discharged.  Safety plan filled out by patient and submitted into chart

## 2023-11-06 NOTE — BH INPATIENT PSYCHIATRY PROGRESS NOTE - CURRENT MEDICATION
MEDICATIONS  (STANDING):  traZODone 50 milliGRAM(s) Oral at bedtime    MEDICATIONS  (PRN):  chlorproMAZINE    Injectable 50 milliGRAM(s) IntraMuscular once PRN Acute agitation  chlorproMAZINE    Tablet 50 milliGRAM(s) Oral every 6 hours PRN agitation  diphenhydrAMINE 50 milliGRAM(s) Oral at bedtime PRN Insomnia  diphenhydrAMINE Injectable 50 milliGRAM(s) IntraMuscular once PRN Acute agitation  LORazepam     Tablet 2 milliGRAM(s) Oral every 6 hours PRN Anxiety/aggression  LORazepam   Injectable 2 milliGRAM(s) IntraMuscular once PRN Acute Aggression

## 2023-11-06 NOTE — BH INPATIENT PSYCHIATRY PROGRESS NOTE - NSBHASSESSSUMMFT_PSY_ALL_CORE
Patient is a 28 year old single male, no dependents.  Patient domiciles in private residence with family, currently unemployed.  Patient has PPH diagnosis unknown at this time and history of Cannabis and nicotine abuse.  Patient has 1 prior inpatient hospitalizations at [facility] 2 yrs ago for paranoia.  EMS/911 called by family due to increasing bizarre/erratic  behaviors.   Patient is now re-hospitalized with a primary problem of psychotic decompensation and disorganization.  Patient admitted to [facility] on a 9.39 legal status. Patient requires inpatient hospitalization due to symptoms of mental illness so severe that they significantly interfere with activities of daily living, and presents a potential danger to self as a result of psychotic decompensation. He is requiring 24-hour care at this time as a result, for psychiatric stabilization and safety.    Plan:  >Legal: 9.39  >Obs: Routine, no current SI. no need for CO, patient not expected to pose risk to self or others in controlled inpatient setting  >Psychiatric Meds: Observe for tolerability and efficacy. Patient had been poorly adherent prior to admission.  -Abilify 5mg daily, titrate 10mg on 10/27 --- titrate 15mg on 10/30 (discontinue po Abilify due to CUEVAS)  -Insurance approved for CUEVAS...  -plan to give one time dose Abilify 30mg on 11/2 with Aristada Initio 675mg  -Aristada 882mg on 11/3  PRN medications:  Ativan 2mg oral Q6HR PRN for agitation and anxiety.  Haldol 5mg oral Q6HR PRN for agitation.   Benadryl 50mg oral Q6HR PRN for agitation.   >Labs:  labs reviewed, no acute findings. Hold antipsychotics if QTc >500  >Medical:   No acute concerns. No consultations needed at this time. No indication for CIWA. Patient with consistently stable VS, no visible physical symptoms of withdrawal.   During the course of treatment, will collaborate with medical team to manage medical issues.  >Diet: Regular  >Social: milieu/structured therapy  >Treatment Interventions: Groups and Individual Therapy/CBT, Motivational counseling for substance abuse related issues.   >Dispo: Collateral and dispo planning pending further symptom and medication optimization. Working on dc for early next week 11/8

## 2023-11-06 NOTE — BH INPATIENT PSYCHIATRY DISCHARGE NOTE - NSBHDCHANDOFFFT_PSY_ALL_CORE
Medication list and discharge summary included discussion of hospital course, treatment, and medications, with phone number left for call back provided to outpatient Psychiatrist at …  Writer's contact information was provided for any further questions or concerns to contact Kary Carney NP at [phone].  Medication list and discharge summary included discussion of hospital course, treatment, and medications, with phone number left for call back provided to outpatient Psychiatrist at [facility].  Writer's contact information was provided for any further questions or concerns to contact Kary Carney NP at [phone].

## 2023-11-06 NOTE — BH INPATIENT PSYCHIATRY PROGRESS NOTE - NSBHFUPINTERVALHXFT_PSY_A_CORE
Pt seen and examined. No acute overnight events reported. Pt standing by the water fountain gazing at the nurses station; appears internally preoccupied, consistently flat affect. Pt remains medication compliant, well-tolerated, without any reported side effects. Thursday 11/2 pt received one-time dose of Abilify 30mg PO and Aristada Initio 675mg injection. Friday 11/3 pt received Aristada 882mg IM injection. Reports no side effects (no tremors, akathisia, or EPS). Pt endorses attending group daily; states they went outside yesterday and then played the grafter. Denies SI/HI, no plan or intent. Denies AH/CAH/VH. Endorses eating well, no missed meals. Endorses sleeping well after being given PRN Benadryl 50mg & Ativan 2mg. Responses are still very guarded and calculated in general. Does not interact much w/ others on the unit during the day, only really speaks when spoken to. In good behavioral control. Per nurse, pt’s brother visited yesterday 11/5 - all went well. NP spoke with patient's family (brother Adams and mother Noreen) at [phone] on 10/30 to discuss treatment progress, medication regimen/CUEVAS and aftercare recommendations. KRIS sent referral to [facility] for outpt f/u upon d/c, which is tentatively scheduled for Wednesday 11/8. KRIS has not heard back from [facility] as of 11/6, and is sending an additional referral to [facility] in [place]. Pt denies any acute complaints or medical problems. Will continue to monitor and provide therapeutic support.

## 2023-11-06 NOTE — BH PSYCHOLOGY - GROUP THERAPY NOTE - NSBHPSYCHOLRESPCOMMENT_PSY_A_CORE FT
Patient discussed a cristobal and a thorn they had over the weekend. Patient stated his cristobal was that he had good snacks yesterday, and his thorn was something was wrong with his bathroom door. Patient read from worksheet when asked. Patient appeared to be actively listening during group.

## 2023-11-06 NOTE — BH INPATIENT PSYCHIATRY DISCHARGE NOTE - NSBHDCRISKMITIGATE_PSY_ALL_CORE
Safety planning/Family/Other social support involvement/Long acting injectable medication/Medications targeting suicidality/non-suicidal self injurious behavior

## 2023-11-06 NOTE — BH INPATIENT PSYCHIATRY DISCHARGE NOTE - NSBHDCRISKMITIGATEFT_PSY_ALL_CORE
The treatment regimen in combination with other treatment modalities led to: Improved reality testing. Improved mood and behavior stabilization. Symptoms reduction and stabilization. Functional improvement. Improved socialization. Positive thinking and gaining insight. Patient received CUEVAS during course of treatment. Medication reviewed, follow up care provided to patient, and safety plan reviewed with patient.

## 2023-11-06 NOTE — BH INPATIENT PSYCHIATRY DISCHARGE NOTE - NSDCMRMEDTOKEN_GEN_ALL_CORE_FT
Aristada 882 mg/3.2 mL intramuscular suspension, extended release: 882 milligram(s) intramuscularly every 4 weeks  Aristada Initio 675 mg/2.4 mL intramuscular suspension, extended release: 675 milligram(s) intramuscularly once   Aristada 882 mg/3.2 mL intramuscular suspension, extended release: 882 milligram(s) intramuscularly every 4 weeks Patient last received on 11/3/23, next dose 12/3/23 MDD: 882mg  traZODone 50 mg oral tablet: 1 tab(s) orally once a day (at bedtime) as needed for  insomnia MDD: 50mg

## 2023-11-06 NOTE — BH INPATIENT PSYCHIATRY DISCHARGE NOTE - OTHER PAST PSYCHIATRIC HISTORY (INCLUDE DETAILS REGARDING ONSET, COURSE OF ILLNESS, INPATIENT/OUTPATIENT TREATMENT)
Pt is a 27YO single [place]  man, domiciled in a private residence in [place] with his mom and brother, BIB mom and brother for bizarre and erratic behaviors. PPH of one hospitalization at [facility] 2 years ago for similar presentation, bizarre behaviors, paranoia, dx with psychosis, denied SIHIIP/AVH, oddly related upon interview, pleasant but short with answers, spoke in a "[id number]" way. Per EMR, pt is grossly paranoid, asked to sleep in mom's bed because he was scared, often wanders and walks outside, irrational and disorganized thought processes. Per EMR/family, pt is with poor ADLs, lacks insight, has not changed hi clothes since Friday where family stated he became even more paranoid, has not been eating, has been labile (tearful at times), and praying 10x/day which is unusual for pt. Pt was previously in [facility], unknown where, but is not currently connected.   Pt denied substance use currently, however he endorsed a DWI when he was 22. Pt stated that he completed a substance abuse program and did not do any detention time. Pt stated he smoked 1/2 pack of cigarettes/day.   Pt denied medical issues.   Pt denied current legal issues.   Pt stated that he grew up with his mom and brother and currently live with them. Pt described a good upbringing, denied physical, emotional, sexual abuse, neglect, IPV/DV. Pt stated he does not have a significant other and he does not have any children. 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 Pt stated that he received an Associate's Degree. Pt discussed that he identifies with Mormonism, denied any restrictions at this time. Pt stated that he does not have a support network and does not have any friends and does not want friends. Pt stated that he enjoys watching tv and going for walks in his spare time.     Pt declined all consents at this time.     Pt is currently uninsured and Medicaid application is being processed.

## 2023-11-06 NOTE — BH INPATIENT PSYCHIATRY DISCHARGE NOTE - NSBHFUPINTERVALHXFT_PSY_A_CORE
Pt seen and examined. No acute overnight events reported. Pt pacing up and down the men’s hallway, appears eager for discharge. Pt remains medication compliant, well-tolerated, without any reported side effects. Thursday 11/2 pt received one-time dose of Abilify 30mg PO and Aristada Initio 675mg injection. Friday 11/3 pt received Aristada 882mg IM injection. Reports no side effects (no tremors, akathisia, or EPS). AIMS screening done, scored 0.  Pt endorses attending group daily. Denies SI/HI, no plan or intent, engages in safety planning. States he’s comfortable talking to his mother if any new or worsening sx arise upon discharge. Also identifies his brother as a close contact for support. Denies AH/CAH/VH. Endorses eating well, no missed meals. Endorses poor sleep last night d/t feeling “excited” about discharge. In good behavioral control. Per nurse, pt’s brother visited 11/5 - all went well. NP spoke with patient's mother Noreen at [phone] on 11/6 to discuss treatment progress, medication regimen/CUEVAS, aftercare recommendations, and discharge plan. Mother will  pt today 11/8 at 11am from [facility]. SW sent referral to [facility] for outpt f/u upon d/c, which has been confirmed - Pt is scheduled for mental health intake appt tmrw 11/9 and chemical dependence appt on Monday 11/13. Pt denies any acute complaints or medical problems. 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 VSS: 97.3, 122/78, 100.

## 2023-11-07 PROCEDURE — 90853 GROUP PSYCHOTHERAPY: CPT

## 2023-11-07 PROCEDURE — 99232 SBSQ HOSP IP/OBS MODERATE 35: CPT

## 2023-11-07 RX ORDER — TRAZODONE HCL 50 MG
1 TABLET ORAL
Qty: 30 | Refills: 0
Start: 2023-11-07 | End: 2023-12-06

## 2023-11-07 RX ADMIN — Medication 50 MILLIGRAM(S): at 20:35

## 2023-11-07 RX ADMIN — Medication 2 MILLIGRAM(S): at 20:35

## 2023-11-07 NOTE — BH PSYCHOLOGY - GROUP THERAPY NOTE - TOKEN PULL-DIAGNOSIS
Primary Diagnosis:  Psychosis [F29]        Problem Dx:   Cannabis abuse [F12.10]      Noncompliance with medications [Z91.148]      Psychosis, unspecified psychosis type [F29]      

## 2023-11-07 NOTE — BH INPATIENT PSYCHIATRY PROGRESS NOTE - NSTXDCOTHRDATEEST_PSY_ALL_CORE
25-Oct-2023

## 2023-11-07 NOTE — BH PSYCHOLOGY - GROUP THERAPY NOTE - NSBHPSYCHOLASSESSPROV_PSY_A_CORE
Licensed Psychologist and Psychology Trainee
Licensed Psychologist
Licensed Psychologist and Psychology Trainee

## 2023-11-07 NOTE — BH INPATIENT PSYCHIATRY PROGRESS NOTE - NSBHFUPINTERVALCCFT_PSY_A_CORE
psychosis/disorganization
f/u for psychosis/disorganization
psychosis/disorganization
f/u for psychosis/disorganization
psychosis/disorganization

## 2023-11-07 NOTE — BH INPATIENT PSYCHIATRY PROGRESS NOTE - NSBHMSEKNOWHOW_PSY_ALL_CORE
Current Events/Vocabulary
Current Events
Current Events/Vocabulary
Current Events
Current Events/Vocabulary

## 2023-11-07 NOTE — BH PSYCHOLOGY - GROUP THERAPY NOTE - NSPSYCHOLGRPCOGGOAL_PSY_A_CORE FT
Decrease symptoms, Develop coping/emotion regulation skills, Psychoeducation 

## 2023-11-07 NOTE — BH DISCHARGE NOTE NURSING/SOCIAL WORK/PSYCH REHAB - PATIENT PORTAL LINK FT
You can access the FollowMyHealth Patient Portal offered by [facility] by registering at the following website: [email or web address]. By joining [facility] FollowMyHealth portal, you will also be able to view your health information using other applications (apps) compatible with our system.

## 2023-11-07 NOTE — BH PSYCHOLOGY - GROUP THERAPY NOTE - NSPSYCHOLGRPCOGINT_PSY_A_CORE
group members provided support/group members suggested positive behaviors/other..
group members provided support/group members suggested positive behaviors/mindfulness skills taught/other..
group members provided support/group members suggested positive behaviors/other..

## 2023-11-07 NOTE — DIETITIAN INITIAL EVALUATION ADULT - OTHER INFO
Patient is a 28 year old male; PPH diagnosis unknown at this time and history of Cannabis and nicotine abuse; Patient has 1 prior inpatient hospitalizations at [facility] 2 yrs ago for paranoia.  EMS/911 called by family due to increasing bizarre/erratic  behaviors. Patient is now re-hospitalized with a primary problem of psychotic decompensation and disorganization.  Spoke to patient in the day/dining room. Patient states " I am eating well". States he does not eat pork. Food preferences obtained. Documented weight : 11/4 180#- patient asked to be weighed- weighed patient- 170#. Denies GI distress. No weight changes. Labs: Cholesterol, triglycerides and LDL high- patient received verbal and written diet education on a healthy diet and encouraged being active.

## 2023-11-07 NOTE — BH DISCHARGE NOTE NURSING/SOCIAL WORK/PSYCH REHAB - NSCDUDCCRISIS_PSY_A_CORE
[facility] Well  1 [phone] [facility]-WELL ([phone])  Text "WELL" to [phone]  Website: www.[employer]/.[employer] [facility] 1 (461) 621-TVTL (4430) Website: www.safehorizon.org/.National Suicide Prevention Lifeline 4 (329) 782-9062/.  [employer]  1 (403) LIFENET (810-0508)/.  [facility]  [phone] [place] [phone]   Hours:  Monday through Friday from 9 AM to 3 PM/988 Suicide and Crisis Lifeline

## 2023-11-07 NOTE — BH PSYCHOLOGY - GROUP THERAPY NOTE - NSPSYCHOLGRPCOGINT_PSY_A_CORE FT
Cognitive/behavioral therapy, Acceptance and Commitment Therapy, Emotion regulation/coping skills taught, Psychoed 

## 2023-11-07 NOTE — BH INPATIENT PSYCHIATRY PROGRESS NOTE - NSTXDCOTHRGOAL_PSY_ALL_CORE
Pt will show a reduction of symptoms and engage meaningfully with writer to identify a safe discharge plan. Pt will comply with recommended tx plan and medications for 5 days, identify 2 benefits for adhering to tx.

## 2023-11-07 NOTE — BH DISCHARGE NOTE NURSING/SOCIAL WORK/PSYCH REHAB - NSDCADDINFO1FT_PSY_ALL_CORE
This is an in-person intake for mental health services. Please arrive 15 minutes early with your insurance card and photo ID.

## 2023-11-07 NOTE — BH DISCHARGE NOTE NURSING/SOCIAL WORK/PSYCH REHAB - NSDCPRRECOMMEND_PSY_ALL_CORE
Please follow up with your assigned appointment.  Please follow up your appointment with [facility] on 11/9/2023 at 12:30 PM.

## 2023-11-07 NOTE — BH PSYCHOLOGY - GROUP THERAPY NOTE - NSBHPSYCHOLRESPONSE_PSY_A_CORE
Colon done at [facility] 11/17/2021  
Accepted support

## 2023-11-07 NOTE — BH INPATIENT PSYCHIATRY PROGRESS NOTE - NSBHASSESSSUMMFT_PSY_ALL_CORE
Patient is a 28 year old single male, no dependents.  Patient domiciles in private residence with family, currently unemployed.  Patient has PPH diagnosis unknown at this time and history of Cannabis and nicotine abuse.  Patient has 1 prior inpatient hospitalizations at [facility] 2 yrs ago for paranoia.  EMS/911 called by family due to increasing bizarre/erratic  behaviors.   Patient is now re-hospitalized with a primary problem of psychotic decompensation and disorganization.  Patient admitted to [facility] on a 9.39 legal status. Patient requires inpatient hospitalization due to symptoms of mental illness so severe that they significantly interfere with activities of daily living, and presents a potential danger to self as a result of psychotic decompensation. He is requiring 24-hour care at this time as a result, for psychiatric stabilization and safety.    Plan:  >Legal: 9.39  >Obs: Routine, no current SI. no need for CO, patient not expected to pose risk to self or others in controlled inpatient setting  >Psychiatric Meds: Observe for tolerability and efficacy. Patient had been poorly adherent prior to admission.  -Abilify 5mg daily, titrate 10mg on 10/27 --- titrate 15mg on 10/30 (discontinue po Abilify due to CUEVAS)  -Insurance approved for CUEVAS...  -plan to give one time dose Abilify 30mg on 11/2 with Aristada Initio 675mg  -Aristada 882mg on 11/3  PRN medications:  Ativan 2mg oral Q6HR PRN for agitation and anxiety.  Haldol 5mg oral Q6HR PRN for agitation.   Benadryl 50mg oral Q6HR PRN for agitation.   >Labs:  labs reviewed, no acute findings. Hold antipsychotics if QTc >500  >Medical:   No acute concerns. No consultations needed at this time. Patient with consistently stable VS. During the course of treatment, will collaborate with medical team to manage medical issues.  >Diet: Regular  >Social: milieu/structured therapy  >Treatment Interventions: Groups and Individual Therapy/CBT, Motivational counseling for substance abuse related issues.   >Dispo: Collateral and dispo planning pending further symptom and medication optimization. Working on dc for  11/8

## 2023-11-07 NOTE — BH INPATIENT PSYCHIATRY PROGRESS NOTE - NSBHCHARTREVIEWLAB_PSY_A_CORE FT
labs reviewed no acute findings  utox negative  BAL <10   UA unremarkable  
Admission labs reviewed no acute findings  utox negative  BAL <10   UA unremarkable  
labs reviewed no acute findings  utox negative  BAL <10   UA unremarkable  
Adequate: hears normal conversation without difficulty
labs reviewed no acute findings  utox negative  BAL <10   UA unremarkable  

## 2023-11-07 NOTE — BH INPATIENT PSYCHIATRY PROGRESS NOTE - NSBHATTESTATTENDBILLTIME2_PSY_A_CORE
I have reviewed and verified the documentation.

## 2023-11-07 NOTE — BH INPATIENT PSYCHIATRY PROGRESS NOTE - NSCGISEVERILLNESS_PSY_ALL_CORE
6 = Severely ill - disruptive pathology, behavior and function are frequently influenced by symptoms, may require assistance from others
5 = Markedly ill - intrusive symptoms that distinctly impair social/occupational function or cause intrusive levels of distress
6 = Severely ill - disruptive pathology, behavior and function are frequently influenced by symptoms, may require assistance from others
6 = Severely ill - disruptive pathology, behavior and function are frequently influenced by symptoms, may require assistance from others
5 = Markedly ill - intrusive symptoms that distinctly impair social/occupational function or cause intrusive levels of distress
6 = Severely ill - disruptive pathology, behavior and function are frequently influenced by symptoms, may require assistance from others
5 = Markedly ill - intrusive symptoms that distinctly impair social/occupational function or cause intrusive levels of distress
5 = Markedly ill - intrusive symptoms that distinctly impair social/occupational function or cause intrusive levels of distress

## 2023-11-07 NOTE — BH INPATIENT PSYCHIATRY PROGRESS NOTE - NSBHATTESTTYPEVISIT_PSY_A_CORE
On-site Attending supervising JANIS (99XXX codes)
JANIS without on-site Attending supervision
On-site Attending supervising JANIS (99XXX codes)
On-site Attending supervising JANIS (99XXX codes)
JANIS without on-site Attending supervision
On-site Attending supervising JANIS (99XXX codes)

## 2023-11-07 NOTE — BH INPATIENT PSYCHIATRY PROGRESS NOTE - NSTXDISORGGOAL_PSY_ALL_CORE
Will demonstrate the ability to maintain reality orientation and communicate clearly with others during 2 conversations with staff daily
Will make at least 3 goal and reality oriented statements during therapy
Will demonstrate the ability to maintain reality orientation and communicate clearly with others during 2 conversations with staff daily
Will demonstrate the ability to maintain reality orientation and communicate clearly with others during 2 conversations with staff daily
Will make at least 3 goal and reality oriented statements during therapy
Will make at least 3 goal and reality oriented statements during therapy
Will demonstrate the ability to maintain reality orientation and communicate clearly with others during 2 conversations with staff daily
Will make at least 3 goal and reality oriented statements during therapy
Will demonstrate the ability to maintain reality orientation and communicate clearly with others during 2 conversations with staff daily
Will make at least 3 goal and reality oriented statements during therapy
Will make at least 3 goal and reality oriented statements during therapy

## 2023-11-07 NOTE — BH INPATIENT PSYCHIATRY PROGRESS NOTE - NSTXDISORGDATEEST_PSY_ALL_CORE
25-Oct-2023
25-Oct-2023
24-Oct-2023
25-Oct-2023
25-Oct-2023
24-Oct-2023
25-Oct-2023
24-Oct-2023
25-Oct-2023

## 2023-11-07 NOTE — BH DISCHARGE NOTE NURSING/SOCIAL WORK/PSYCH REHAB - NSDCADDINFO2FT_PSY_ALL_CORE
This is an in-person chemical dependency intake. Please arrive 15 minutes early with insurance card and photo ID.

## 2023-11-07 NOTE — BH DISCHARGE NOTE NURSING/SOCIAL WORK/PSYCH REHAB - MODE OF TRANSPORTATION
[facility]  [address], [place] [address]             Progress Note    Subjective   Patient Ninfa Camacho is a 70 year old male     Chief Complaint   Patient presents with   • Office Visit   • Follow-up     HPI  Patient presenting to the clinic for    HTN  -elevated today  -currently taking losartan 50mg daily consistently   -says hasnt been following a healthy diet recently   -denies chest pain, sob, palpitations, or dizziness      Influenza vaccine: Declined     Current Medications  Current Outpatient Medications   Medication Sig Dispense Refill   • losartan-hydrochlorothiazide (HYZAAR) 50-12.5 MG per tablet Take 1 tablet by mouth daily. 30 tablet 0   • Ketoconazole 2 % Gel Apply 1 application. topically daily. 45 g 1     No current facility-administered medications for this visit.       Allergies  ALLERGIES:  No Known Allergies    Review of Systems   Constitutional: Negative for chills and fever.   Eyes: Negative for visual disturbance.   Respiratory: Negative for shortness of breath.    Cardiovascular: Negative for chest pain.   Genitourinary: Negative.    Neurological: Negative.        Objective  Visit Vitals  BP (!) 156/82 (BP Location: RUE - Right upper extremity, Patient Position: Sitting, Cuff Size: Large Adult)   Pulse 88   Temp 98.4 °F (36.9 °C) (Temporal)   Ht 6' 1\" (1.854 m)   Wt (!) 156.4 kg (344 lb 12.8 oz)   BMI 45.49 kg/m²       Physical Exam  Constitutional:       Appearance: Normal appearance.   HENT:      Head: Normocephalic and atraumatic.   Cardiovascular:      Rate and Rhythm: Normal rate and regular rhythm.      Heart sounds: Normal heart sounds.   Pulmonary:      Effort: Pulmonary effort is normal.      Breath sounds: Normal breath sounds.   Skin:     General: Skin is warm and dry.   Neurological:      General: No focal deficit present.      Mental Status: He is alert and oriented to person, place, and time.   Psychiatric:         Mood and Affect: Mood normal.          Behavior: Behavior normal.          Labs  Lab Results Reviewed      Assessment and Plan  There are no diagnoses linked to this encounter.  Problem List Items Addressed This Visit        Cardiac and Vasculature    HTN (hypertension), benign - Primary     -elevated at 156/82   -stop losartan 50mg daily. Start losartan-hctz 50-12.5mg daily  -low sodium diet  -f/u in 4 weeks          Relevant Medications    losartan-hydrochlorothiazide (HYZAAR) 50-12.5 MG per tablet       Endocrine and Metabolic    Prediabetes    Relevant Orders    SERVICE TO OPHTHALMOLOGY       10/16/2023  Isabela Hearn, CNP     Ambulatory

## 2023-11-07 NOTE — BH INPATIENT PSYCHIATRY PROGRESS NOTE - NSTXMEDICDATEEST_PSY_ALL_CORE
25-Oct-2023
24-Oct-2023
25-Oct-2023

## 2023-11-07 NOTE — BH INPATIENT PSYCHIATRY PROGRESS NOTE - PRN MEDS
MEDICATIONS  (PRN):  chlorproMAZINE    Injectable 50 milliGRAM(s) IntraMuscular once PRN Acute agitation  chlorproMAZINE    Tablet 50 milliGRAM(s) Oral every 6 hours PRN agitation  diphenhydrAMINE 50 milliGRAM(s) Oral at bedtime PRN Insomnia  diphenhydrAMINE Injectable 50 milliGRAM(s) IntraMuscular once PRN Acute agitation  LORazepam     Tablet 2 milliGRAM(s) Oral every 6 hours PRN Agitation  LORazepam   Injectable 2 milliGRAM(s) IntraMuscular once PRN Acute agitation  
MEDICATIONS  (PRN):  chlorproMAZINE    Injectable 50 milliGRAM(s) IntraMuscular once PRN Acute agitation  chlorproMAZINE    Tablet 50 milliGRAM(s) Oral every 6 hours PRN agitation  diphenhydrAMINE 50 milliGRAM(s) Oral at bedtime PRN Insomnia  diphenhydrAMINE Injectable 50 milliGRAM(s) IntraMuscular once PRN Acute agitation  LORazepam     Tablet 2 milliGRAM(s) Oral every 6 hours PRN Anxiety/aggression  LORazepam   Injectable 2 milliGRAM(s) IntraMuscular once PRN Acute Aggression  
MEDICATIONS  (PRN):  chlorproMAZINE    Injectable 50 milliGRAM(s) IntraMuscular once PRN Acute agitation  chlorproMAZINE    Tablet 50 milliGRAM(s) Oral every 6 hours PRN agitation  diphenhydrAMINE 50 milliGRAM(s) Oral at bedtime PRN Insomnia  diphenhydrAMINE Injectable 50 milliGRAM(s) IntraMuscular once PRN Acute agitation  LORazepam     Tablet 2 milliGRAM(s) Oral every 6 hours PRN Anxiety/aggression  LORazepam   Injectable 2 milliGRAM(s) IntraMuscular once PRN Acute Aggression  
MEDICATIONS  (PRN):  chlorproMAZINE    Injectable 50 milliGRAM(s) IntraMuscular once PRN Acute agitation  chlorproMAZINE    Tablet 50 milliGRAM(s) Oral every 6 hours PRN agitation  diphenhydrAMINE 50 milliGRAM(s) Oral at bedtime PRN Insomnia  diphenhydrAMINE Injectable 50 milliGRAM(s) IntraMuscular once PRN Acute agitation  LORazepam     Tablet 2 milliGRAM(s) Oral every 6 hours PRN Agitation  LORazepam   Injectable 2 milliGRAM(s) IntraMuscular every 6 hours PRN Agitation  
MEDICATIONS  (PRN):  chlorproMAZINE    Injectable 50 milliGRAM(s) IntraMuscular once PRN Acute agitation  chlorproMAZINE    Tablet 50 milliGRAM(s) Oral every 6 hours PRN agitation  diphenhydrAMINE 50 milliGRAM(s) Oral at bedtime PRN Insomnia  diphenhydrAMINE Injectable 50 milliGRAM(s) IntraMuscular once PRN Acute agitation  LORazepam     Tablet 2 milliGRAM(s) Oral every 6 hours PRN Agitation  LORazepam   Injectable 2 milliGRAM(s) IntraMuscular once PRN Acute agitation  
MEDICATIONS  (PRN):  chlorproMAZINE    Injectable 50 milliGRAM(s) IntraMuscular once PRN Acute agitation  chlorproMAZINE    Tablet 50 milliGRAM(s) Oral every 6 hours PRN agitation  diphenhydrAMINE 50 milliGRAM(s) Oral at bedtime PRN Insomnia  diphenhydrAMINE Injectable 50 milliGRAM(s) IntraMuscular once PRN Acute agitation  LORazepam     Tablet 2 milliGRAM(s) Oral every 6 hours PRN Agitation  LORazepam   Injectable 2 milliGRAM(s) IntraMuscular once PRN Acute agitation  
MEDICATIONS  (PRN):  chlorproMAZINE    Injectable 50 milliGRAM(s) IntraMuscular once PRN Acute agitation  chlorproMAZINE    Tablet 50 milliGRAM(s) Oral every 6 hours PRN agitation  diphenhydrAMINE 50 milliGRAM(s) Oral at bedtime PRN Insomnia  diphenhydrAMINE Injectable 50 milliGRAM(s) IntraMuscular once PRN Acute agitation  LORazepam     Tablet 2 milliGRAM(s) Oral every 6 hours PRN Anxiety/aggression  LORazepam   Injectable 2 milliGRAM(s) IntraMuscular once PRN Acute Aggression

## 2023-11-07 NOTE — BH INPATIENT PSYCHIATRY PROGRESS NOTE - NSDCCRITERIA_PSY_ALL_CORE
Symptom Stabilization  CGI<=3  Outpatient services f/u  Consider CUEVAS (Aristada)  
Symptom Stabilization  CGI<=3  Outpatient services f/u  Consider CUEVAS (Aristada)  
Symptom Stabilization  CGI<=3  Outpatient services f/u  Consider CUEAVS (Aristada)  
Symptom Stabilization  CGI<=3  Outpatient services f/u  Consider CUEVAS (Aristada)  

## 2023-11-07 NOTE — BH INPATIENT PSYCHIATRY PROGRESS NOTE - NSBHCHARTREVIEWVS_PSY_A_CORE FT
Vital Signs Last 24 Hrs  T(C): 36.3 (11-06-23 @ 19:21), Max: 36.7 (11-06-23 @ 08:49)  T(F): 97.3 (11-06-23 @ 19:21), Max: 98.1 (11-06-23 @ 08:49)  HR: 100 (11-06-23 @ 08:54) (100 - 100)  BP: --  BP(mean): --  RR: 18 (11-06-23 @ 20:15) (18 - 18)  SpO2: --    Orthostatic VS  11-06-23 @ 19:21  Lying BP: --/-- HR: --  Sitting BP: 124/79 HR: 105  Standing BP: 129/76 HR: 111  Site: --  Mode: --  Orthostatic VS  11-06-23 @ 08:49  Lying BP: --/-- HR: --  Sitting BP: 118/72 HR: 115  Standing BP: --/-- HR: --  Site: --  Mode: --  Orthostatic VS  11-05-23 @ 19:51  Lying BP: --/-- HR: --  Sitting BP: 139/79 HR: 96  Standing BP: 131/80 HR: 110  Site: upper left arm  Mode: electronic  Orthostatic VS  11-05-23 @ 07:42  Lying BP: --/-- HR: --  Sitting BP: 139/79 HR: 96  Standing BP: 131/80 HR: 110  Site: --  Mode: --   Vital Signs Last 24 Hrs  T(C): 35.9 (11-07-23 @ 08:24), Max: 36.3 (11-06-23 @ 19:21)  T(F): 96.7 (11-07-23 @ 08:24), Max: 97.3 (11-06-23 @ 19:21)  HR: --  BP: --  BP(mean): --  RR: 18 (11-06-23 @ 20:15) (18 - 18)  SpO2: --    Orthostatic VS  11-07-23 @ 08:24  Lying BP: --/-- HR: --  Sitting BP: 118/75 HR: 93  Standing BP: 121/79 HR: 111  Site: --  Mode: electronic  Orthostatic VS  11-06-23 @ 19:21  Lying BP: --/-- HR: --  Sitting BP: 124/79 HR: 105  Standing BP: 129/76 HR: 111  Site: --  Mode: --  Orthostatic VS  11-06-23 @ 08:49  Lying BP: --/-- HR: --  Sitting BP: 118/72 HR: 115  Standing BP: --/-- HR: --  Site: --  Mode: --  Orthostatic VS  11-05-23 @ 19:51  Lying BP: --/-- HR: --  Sitting BP: 139/79 HR: 96  Standing BP: 131/80 HR: 110  Site: upper left arm  Mode: electronic

## 2023-11-07 NOTE — BH INPATIENT PSYCHIATRY PROGRESS NOTE - NSICDXBHSECONDARYDX_PSY_ALL_CORE
Noncompliance with medications   Z91.148  Cannabis abuse   F12.10  
No
Noncompliance with medications   Z91.148  Cannabis abuse   F12.10  

## 2023-11-07 NOTE — BH INPATIENT PSYCHIATRY PROGRESS NOTE - NSBHMSETHTASSOC_PSY_A_CORE
MANTOUX TUBERCULIN (a.k.a. TB) SKIN TEST      What is Tuberculosis/TB?  Tuberculosis/TB is an infectious disease, which is spread through the air by tiny germs when people cough, sneeze, speak or sing.  TB germs are very small and can remain in the air for a long period of time. TB germs most often settle in your lungs, but may also settle in other organs of your body.  TB germs can be present in your body without making you ill.  This is called TB INFECTION.  TB infection cannot be spread to other people.  When your body’s defenses become weak and can no longer control the TB germs they multiply, this is called TB DISEASE.  It can take month(s) to year(s) for TB infection to become TB disease.  The TB SKIN TEST can show if a person has been “infected” by TB germs.    How is the Mantoux Tuberculin/TB skin test given?  A very small amount of a product called Purified Protein Derivative (PPD) is injected just under the top layer of the skin on the forearm.  Persons who have been infected by the TB germs usually react to the test by developing swelling at the injection site.  The skin test results must be read 48 to 72 hours after given.  Failure to return within this time frame means the test will need to be repeated.    Side effects…  Side effects from a skin test are rare and may include itching and discomfort at the injection site and very rarely the possibility of a blister, ulceration or necrosis (dead tissue) at the site if the injection.    Return to:  Any [facility] site on August 24th, after  9:13 a.m. and before 9:13 a.m. on August 25th to have your test read.  
Normal

## 2023-11-07 NOTE — BH PSYCHOLOGY - GROUP THERAPY NOTE - NSPSYCHOLGRPBILLING_PSY_A_CORE
[id number] - Group Psychotherapy
[id number] - Group Psychotherapy
[id number] - Group Psychotherapy
[id number] - Group Psychotherapy
[id number] - Group Psychotherapy

## 2023-11-07 NOTE — BH INPATIENT PSYCHIATRY PROGRESS NOTE - NSBHATTESTATTENDBILLTIME_PSY_A_CORE
I independently performed the documented

## 2023-11-07 NOTE — BH PSYCHOLOGY - GROUP THERAPY NOTE - NSPSYCHOLGRPCOGPROB_PSY_A_CORE FT
Anxiety, Depression, Emotion dysregulation, Lack of coping skills, Psycho-social stressors, Self care, Problem solving 

## 2023-11-07 NOTE — BH INPATIENT PSYCHIATRY PROGRESS NOTE - NSTXDISORGDATETRGT_PSY_ALL_CORE
01-Nov-2023
08-Nov-2023
01-Nov-2023
01-Nov-2023
08-Nov-2023
01-Nov-2023
08-Nov-2023

## 2023-11-07 NOTE — BH INPATIENT PSYCHIATRY PROGRESS NOTE - NSBHFUPINTERVALHXFT_PSY_A_CORE
Pt seen and examined. No acute overnight events reported. Pt standing by the water fountain gazing at the nurses station; appears internally preoccupied, consistently flat affect. Pt remains medication compliant, well-tolerated, without any reported side effects. Thursday 11/2 pt received one-time dose of Abilify 30mg PO and Aristada Initio 675mg injection. Friday 11/3 pt received Aristada 882mg IM injection. Reports no side effects (no tremors, akathisia, or EPS). Pt endorses attending group daily. Denies SI/HI, no plan or intent, engages in safety planning. States he’s comfortable talking to his mother if any new or worsening sx arise upon discharge. Denies AH/CAH/VH. Endorses eating well, no missed meals. Endorses sleeping well after being given PRN Benadryl 50mg. Responses are still very guarded and calculated in general. Does not interact much w/ others on the unit during the day, only really speaks when spoken to. In good behavioral control. Per nurse, pt’s brother visited 11/5 - all went well. NP spoke with patient's mother Noreen at [phone] yesterday 11/6 to discuss treatment progress, medication regimen/CUEVAS, aftercare recommendations, and discharge plan. Mother will  pt tmrw 11/8 at 11am from [facility]. SW sent referral to [facility] for outpt f/u upon d/c, referral is being reviewed at this time. KRIS also sent an additional referral to [facility] in [place] for backup. Pt denies any acute complaints or medical problems. Will continue to monitor and provide therapeutic support. Pt seen and examined. No acute overnight events reported. Pt standing by the water fountain gazing at the nurses station; appears internally preoccupied, consistently flat affect. Pt remains medication compliant, well-tolerated, without any reported side effects. Thursday 11/2 pt received one-time dose of Abilify 30mg PO and Aristada Initio 675mg injection. Friday 11/3 pt received Aristada 882mg IM injection. Reports no side effects (no tremors, akathisia, or EPS). AIMS screening done, scored 0.  Pt endorses attending group daily. Denies SI/HI, no plan or intent, engages in safety planning. States he’s comfortable talking to his mother if any new or worsening sx arise upon discharge. Denies AH/CAH/VH. Endorses eating well, no missed meals. Endorses sleeping well after being given PRN Benadryl 50mg. Does not interact much w/ others on the unit during the day, only really speaks when spoken to. In good behavioral control. Per nurse, pt’s brother visited 11/5 - all went well. NP spoke with patient's mother Noreen at [phone] yesterday 11/6 to discuss treatment progress, medication regimen/CUEVAS, aftercare recommendations, and discharge plan. Mother will  pt tmrw 11/8 at 11am from [facility]. SW sent referral to [facility] for outpt f/u upon d/c, referral is being reviewed at this time. KRIS also sent an additional referral to [facility] in [place] for backup. Pt denies any acute complaints or medical problems. VSS: 96.7, 118/75, 93. Will continue to monitor and provide therapeutic support.

## 2023-11-07 NOTE — DIETITIAN INITIAL EVALUATION ADULT - PERTINENT LABORATORY DATA
10/25 Cholesterol 233, Triglycerides 154,     A1C with Estimated Average Glucose Result: 5.2 % (10-25-23 @ 08:00)

## 2023-11-07 NOTE — BH PSYCHOLOGY - GROUP THERAPY NOTE - NSPSYCHOLGRPCOGPT_PSY_A_CORE FT
Patient attended cognitive behavioral therapy group utilizing the concepts from Acceptance and Commitment therapy. The group started with a brief check in during which the patients were asked to share one thoughtful thing someone did for them recently. The group then focused on the topic of making room for distressing thoughts and feelings through the use of a relevant metaphor.  explained concepts, reinforced participation, and engaged patients in discussion. 
Patient attended Cognitive Behavioral Therapy Group utilizing concepts and skills from Acceptance and Commitment Therapy (ACT). The group started with a brief check in and mindfulness /relaxation exercise. The group then focused on the topic of coping skills, distress tolerance and self care. Patients discussed acceptance of emotional experience and the concept of distress tolerance. Patients shared examples of healthy ways to cope with distress and ways to engage in healthy self-care. Patients also explored the topic of mental health stigma. The  explained concepts, reinforced participation, and engaged patients in the discussion.  
Patient attended cognitive behavioral therapy group utilizing the concepts from Acceptance and Commitment therapy. The group started with a brief check in during which the patients were asked to share what they are grateful for today. The group then focused on the topic of mindfulness through the use of a relevant worksheet.  explained concepts, reinforced participation, and engaged patients in discussion. 
Patient attended cognitive behavioral therapy group utilizing the concepts from Acceptance and Commitment therapy. The group started with a brief check in during which the patients were asked to discuss a cristobal and a thorn from their weekend. The group then focused on the topics of mindfulness and the connection of our physical and mental wellbeing through the use of  relevant worksheets. The group discussed being present in the moment.  The group discussed both physical and mental self care.  explained concepts, reinforced participation, and engaged patients in discussion. 
Patient attended cognitive behavioral therapy group utilizing the concepts from Acceptance and Commitment therapy. The group started with a brief check in during which the patients were asked to share what advice they would give their younger selves. The group then focused on the topic of living a value-directed life while making room for distressing thoughts and feelings through the use of a relevant illustration.  explained concepts, reinforced participation, and engaged patients in discussion.

## 2023-11-07 NOTE — BH INPATIENT PSYCHIATRY PROGRESS NOTE - NSTXMEDICDATETRGT_PSY_ALL_CORE
01-Nov-2023
01-Nov-2023
08-Nov-2023
01-Nov-2023

## 2023-11-07 NOTE — BH DISCHARGE NOTE NURSING/SOCIAL WORK/PSYCH REHAB - NSDCPRGOAL_PSY_ALL_CORE
Pt made progress towards his discharge. Pt has demonstrated daily compliant with medication regimen and dosage. During the individual therapy session, pt has verbally agreed to comply with medication post-discharge. Pt has verbalized benefits of medication compliance such as mood stabilize. Pt currently denies SI, HI, AH and VH. Pt has identified his coping skills such as watching TV, going outside, and smoke cigarettes to manage symptoms. Pt remains to have limited insight into his illness. Writer provided psychoeducation. During this hospitalization, pt showed approximately 85% of group attendance. During the group session, pt was able to tolerate group structure, participated relevantly with some prompting. Pt was visible on the unit, showed minimal interactions with others. Pt maintained fair ADLs. Pt has participated in his safety plan. Pt was provided with press tamicaey survey.

## 2023-11-07 NOTE — BH INPATIENT PSYCHIATRY PROGRESS NOTE - NSBHATTESTBILLING_PSY_A_CORE
59412-Dbolkdcyfq OBS or IP - moderate complexity OR 35-49 mins
90124-Rjongtvpeo OBS or IP - moderate complexity OR 35-49 mins
25103-Lhmpldlssi OBS or IP - moderate complexity OR 35-49 mins
38805-Ovhhhgcamo OBS or IP - low complexity OR 25-34 mins
79758-Wairlimxkd OBS or IP - moderate complexity OR 35-49 mins
14579-Yhtjlazngr OBS or IP - low complexity OR 25-34 mins
36008-Evojpsyeiv OBS or IP - moderate complexity OR 35-49 mins
52411-Ddifeeeeab OBS or IP - moderate complexity OR 35-49 mins
97655-Nmllkijyyf OBS or IP - moderate complexity OR 35-49 mins
11376-Objcmsaalx OBS or IP - moderate complexity OR 35-49 mins
35172-Miutoorhjc OBS or IP - moderate complexity OR 35-49 mins

## 2023-11-07 NOTE — BH INPATIENT PSYCHIATRY PROGRESS NOTE - NSCGIIMPROVESX_PSY_ALL_CORE
4 = No change - symptoms remain essentially unchanged
3 = Minimally improved - slightly better with little or no clinically meaningful reduction of symptoms.  Represents very little change in basic clinical status, level of care, or functional capacity.
3 = Minimally improved - slightly better with little or no clinically meaningful reduction of symptoms.  Represents very little change in basic clinical status, level of care, or functional capacity.
4 = No change - symptoms remain essentially unchanged
3 = Minimally improved - slightly better with little or no clinically meaningful reduction of symptoms.  Represents very little change in basic clinical status, level of care, or functional capacity.
4 = No change - symptoms remain essentially unchanged
3 = Minimally improved - slightly better with little or no clinically meaningful reduction of symptoms.  Represents very little change in basic clinical status, level of care, or functional capacity.

## 2023-11-07 NOTE — BH INPATIENT PSYCHIATRY PROGRESS NOTE - NSBHMSETHTPROC_PSY_A_CORE
guarded, superficial in answers/Linear
Linear
guarded, superficial in answers/Linear
Linear
guarded, superficial in answers/Linear

## 2023-11-07 NOTE — BH DISCHARGE NOTE NURSING/SOCIAL WORK/PSYCH REHAB - DISCHARGE INSTRUCTIONS AFTERCARE APPOINTMENTS
In order to check the location, date, or time of your aftercare appointment, please refer to your Discharge Instructions Document given to you upon leaving the hospital.  If you have lost the instructions please call [phone]

## 2023-11-07 NOTE — BH PSYCHOLOGY - GROUP THERAPY NOTE - NSBHPSYCHOLPARTICIPCOMMENT_PSY_A_CORE FT
Pt appropriately participated
Pt was mostly quiet but spoke when called on by .
Pt volunteered to read aloud from the worksheet and spoke during check-in and when called on by the .
Pt volunteered to read aloud from the worksheet.
Pt was mostly quiet but spoke when called on by .

## 2023-11-08 VITALS — RESPIRATION RATE: 18 BRPM

## 2024-11-23 NOTE — BH INPATIENT PSYCHIATRY PROGRESS NOTE - CURRENT MEDICATION
PLASTIC SURGERY CONSULT NOTE    Patient: KETAN SHEPHERD , 45y ([birth year])Male   MRN: [id number]  Location: [facility] ED  Visit: 11-22-24 Emergency  Date: 11-23-24 @ 03:14    HPI:  The patient is a 45 year old male who presents to the ED s/p dog bite to the face by his own dog. Per the patient and his family at bedside, the dog is temperamental and gets easily agitated and bit the patient's face when he was petting him. The dog is up to date on vaccines, but the patient is unsure of when he last had his tetanus shot. The patient has an obvious laceration to the upper lip vermillion boarder.     PAST MEDICAL & SURGICAL HISTORY:  History of fusion of cervical spine    VITALS:  T(F): 98.5 (11-22-24 @ 23:15), Max: 98.5 (11-22-24 @ 23:15)  HR: 100 (11-22-24 @ 23:15) (100 - 100)  BP: 129/67 (11-22-24 @ 23:15) (129/67 - 129/67)  RR: 18 (11-22-24 @ 23:15) (18 - 18)  SpO2: 95% (11-22-24 @ 23:15) (95% - 95%)    PHYSICAL EXAM:  General: NAD  HEENT: laceration to the upper lip vermillion boarder just to the left of the midline, inferior aspect of upper lip intact, internal mucosa intact  Cardiac: RRR S1, S2  Respiratory: normal respiratory effort  Abdomen: Soft, non-distended, non-tender, no rebound, no guarding. +  Musculoskeletal: Strength 5/5 BL UE/LE, ROM intact, compartments soft  Neuro: Sensation grossly intact and equal throughout, no focal deficits  Vascular: Pulses 2+ throughout, extremities well perfused  Skin: Warm/dry, normal color, no jaundice  Incision/wound: healing well, dressings in place, clean, dry and intact    MEDICATIONS  (STANDING):    MEDICATIONS  (PRN):      LAB/STUDIES:                            IMAGING:      ACCESS DEVICES:  [ ] Peripheral IV  [ ] Central Venous Line	[ ] R	[ ] L	[ ] IJ	[ ] Fem	[ ] SC	Placed:   [ ] Arterial Line		[ ] R	[ ] L	[ ] Fem	[ ] Rad	[ ] Ax	Placed:   [ ] PICC:					[ ] Mediport  [ ] Urinary Catheter, Date Placed:     ASSESSMENT:  45yM w/ PMHx of  ***  who presented with ***. Physical exam findings, imaging, and labs as documented above.     PLAN:  -  -  -    Lines/Tubes: PIV, Midline, Central Line, A-Line, Chest tubes, Jorge/ISABELLA drains, Ortiz Catheter.    Above plan discussed with Attending Surgeon  ***  , patient, patient family, and Primary team  11-23-24 @ 03:14   PLASTIC SURGERY CONSULT NOTE    Patient: KETAN SHEPHERD , 45y ([birth year])Male   MRN: [id number]  Location: [facility] ED  Visit: 11-22-24 Emergency  Date: 11-23-24 @ 03:14    HPI:  The patient is a 45 year old male who presents to the ED s/p dog bite to the face by his own dog. Per the patient and his family at bedside, the dog is temperamental and gets easily agitated and bit the patient's face when he was petting him. The dog is up to date on vaccines, but the patient is unsure of when he last had his tetanus shot. The patient has an obvious laceration to the upper lip vermillion boarder.     PAST MEDICAL & SURGICAL HISTORY:  History of fusion of cervical spine    VITALS:  T(F): 98.5 (11-22-24 @ 23:15), Max: 98.5 (11-22-24 @ 23:15)  HR: 100 (11-22-24 @ 23:15) (100 - 100)  BP: 129/67 (11-22-24 @ 23:15) (129/67 - 129/67)  RR: 18 (11-22-24 @ 23:15) (18 - 18)  SpO2: 95% (11-22-24 @ 23:15) (95% - 95%)    PHYSICAL EXAM:  General: NAD  HEENT: 2 cm laceration to the upper lip vermillion boarder just to the left of the midline extending inferiorly, though inferior aspect of upper lip intact, internal mucosa intact  Cardiac: RRR S1, S2  Respiratory: normal respiratory effort  Musculoskeletal: ROM intact  Neuro: Sensation grossly intact          PLASTIC SURGERY CONSULT NOTE    Patient: KETAN SHEPHERD , 45y ([birth year])Male   MRN: [id number]  Location: [facility] ED  Visit: 11-22-24 Emergency  Date: 11-23-24 @ 03:14    HPI:  The patient is a 45 year old male who presents to the ED s/p dog bite to the face by his own dog. Per the patient and his family at bedside, the dog is temperamental and gets easily agitated and bit the patient's face when he was petting him. The dog is up to date on vaccines, but the patient is unsure of when he last had his tetanus shot. The patient has an obvious laceration to the upper lip vermillion border and the full vertical height of the upper lip just to the left of midline.     PAST MEDICAL & SURGICAL HISTORY:  History of fusion of cervical spine    VITALS:  T(F): 98.5 (11-22-24 @ 23:15), Max: 98.5 (11-22-24 @ 23:15)  HR: 100 (11-22-24 @ 23:15) (100 - 100)  BP: 129/67 (11-22-24 @ 23:15) (129/67 - 129/67)  RR: 18 (11-22-24 @ 23:15) (18 - 18)  SpO2: 95% (11-22-24 @ 23:15) (95% - 95%)    PHYSICAL EXAM:  General: NAD  HEENT: 2 cm laceration to the upper lip vermillion border just to the left of the midline extending inferiorly for the full vertical height, though inferior aspect of upper lip intact, internal mucosa intact  Cardiac: RRR S1, S2  Respiratory: normal respiratory effort  Musculoskeletal: ROM intact  Neuro: Sensation grossly intact          MEDICATIONS  (STANDING):  ARIPiprazole 10 milliGRAM(s) Oral daily  influenza   Vaccine 0.5 milliLiter(s) IntraMuscular once    MEDICATIONS  (PRN):  chlorproMAZINE    Injectable 50 milliGRAM(s) IntraMuscular once PRN Acute agitation  chlorproMAZINE    Tablet 50 milliGRAM(s) Oral every 6 hours PRN agitation  diphenhydrAMINE 50 milliGRAM(s) Oral at bedtime PRN Insomnia  diphenhydrAMINE Injectable 50 milliGRAM(s) IntraMuscular once PRN Acute agitation  LORazepam     Tablet 2 milliGRAM(s) Oral every 6 hours PRN Agitation  LORazepam   Injectable 2 milliGRAM(s) IntraMuscular once PRN Acute agitation